# Patient Record
Sex: FEMALE | Race: WHITE | NOT HISPANIC OR LATINO | Employment: OTHER | URBAN - METROPOLITAN AREA
[De-identification: names, ages, dates, MRNs, and addresses within clinical notes are randomized per-mention and may not be internally consistent; named-entity substitution may affect disease eponyms.]

---

## 2018-10-28 ENCOUNTER — NURSE TRIAGE (OUTPATIENT)
Dept: ADMINISTRATIVE | Facility: CLINIC | Age: 38
End: 2018-10-28

## 2019-04-09 PROBLEM — G35 MULTIPLE SCLEROSIS: Status: ACTIVE | Noted: 2019-04-09

## 2020-02-11 ENCOUNTER — HOSPITAL ENCOUNTER (OUTPATIENT)
Facility: OTHER | Age: 40
Discharge: HOME | End: 2020-02-12
Attending: EMERGENCY MEDICINE | Admitting: EMERGENCY MEDICINE
Payer: MEDICAID

## 2020-02-11 DIAGNOSIS — E87.6 HYPOKALEMIA: ICD-10-CM

## 2020-02-11 DIAGNOSIS — G35 MULTIPLE SCLEROSIS: ICD-10-CM

## 2020-02-11 DIAGNOSIS — R60.0 LEG EDEMA: ICD-10-CM

## 2020-02-11 DIAGNOSIS — E87.0 HYPERNATREMIA: Primary | ICD-10-CM

## 2020-02-11 PROBLEM — E03.9 ACQUIRED HYPOTHYROIDISM: Status: ACTIVE | Noted: 2020-02-11

## 2020-02-11 LAB
ALBUMIN SERPL BCP-MCNC: 4.8 G/DL (ref 3.5–5.2)
ALP SERPL-CCNC: 60 U/L (ref 55–135)
ALT SERPL W/O P-5'-P-CCNC: 12 U/L (ref 10–44)
ANION GAP SERPL CALC-SCNC: 18 MMOL/L (ref 8–16)
AST SERPL-CCNC: 19 U/L (ref 10–40)
BASOPHILS # BLD AUTO: 0.06 K/UL (ref 0–0.2)
BASOPHILS NFR BLD: 1.2 % (ref 0–1.9)
BILIRUB SERPL-MCNC: 0.6 MG/DL (ref 0.1–1)
BILIRUB UR QL STRIP: NEGATIVE
BUN SERPL-MCNC: 16 MG/DL (ref 6–20)
CALCIUM SERPL-MCNC: 10.2 MG/DL (ref 8.7–10.5)
CHLORIDE SERPL-SCNC: 96 MMOL/L (ref 95–110)
CLARITY UR: CLEAR
CO2 SERPL-SCNC: 32 MMOL/L (ref 23–29)
COLOR UR: YELLOW
CREAT SERPL-MCNC: 1.2 MG/DL (ref 0.5–1.4)
CTP QC/QA: YES
DIFFERENTIAL METHOD: ABNORMAL
EOSINOPHIL # BLD AUTO: 0.1 K/UL (ref 0–0.5)
EOSINOPHIL NFR BLD: 2.6 % (ref 0–8)
ERYTHROCYTE [DISTWIDTH] IN BLOOD BY AUTOMATED COUNT: 13.2 % (ref 11.5–14.5)
EST. GFR  (AFRICAN AMERICAN): >60 ML/MIN/1.73 M^2
EST. GFR  (NON AFRICAN AMERICAN): 57 ML/MIN/1.73 M^2
GLUCOSE SERPL-MCNC: 96 MG/DL (ref 70–110)
GLUCOSE UR QL STRIP: NEGATIVE
HCT VFR BLD AUTO: 43.8 % (ref 37–48.5)
HGB BLD-MCNC: 14.4 G/DL (ref 12–16)
HGB UR QL STRIP: NEGATIVE
IMM GRANULOCYTES # BLD AUTO: 0.01 K/UL (ref 0–0.04)
IMM GRANULOCYTES NFR BLD AUTO: 0.2 % (ref 0–0.5)
KETONES UR QL STRIP: NEGATIVE
LACTATE SERPL-SCNC: 1.1 MMOL/L (ref 0.5–2.2)
LEUKOCYTE ESTERASE UR QL STRIP: NEGATIVE
LYMPHOCYTES # BLD AUTO: 1.6 K/UL (ref 1–4.8)
LYMPHOCYTES NFR BLD: 31.2 % (ref 18–48)
MCH RBC QN AUTO: 31.6 PG (ref 27–31)
MCHC RBC AUTO-ENTMCNC: 32.9 G/DL (ref 32–36)
MCV RBC AUTO: 96 FL (ref 82–98)
MONOCYTES # BLD AUTO: 0.7 K/UL (ref 0.3–1)
MONOCYTES NFR BLD: 14.5 % (ref 4–15)
NEUTROPHILS # BLD AUTO: 2.5 K/UL (ref 1.8–7.7)
NEUTROPHILS NFR BLD: 50.3 % (ref 38–73)
NITRITE UR QL STRIP: NEGATIVE
NRBC BLD-RTO: 0 /100 WBC
PH UR STRIP: 6 [PH] (ref 5–8)
PLATELET # BLD AUTO: 214 K/UL (ref 150–350)
PMV BLD AUTO: 11.3 FL (ref 9.2–12.9)
POC MOLECULAR INFLUENZA A AGN: NEGATIVE
POC MOLECULAR INFLUENZA B AGN: NEGATIVE
POCT GLUCOSE: 91 MG/DL (ref 70–110)
POTASSIUM SERPL-SCNC: 2.6 MMOL/L (ref 3.5–5.1)
PROT SERPL-MCNC: 8 G/DL (ref 6–8.4)
PROT UR QL STRIP: NEGATIVE
RBC # BLD AUTO: 4.56 M/UL (ref 4–5.4)
SODIUM SERPL-SCNC: 146 MMOL/L (ref 136–145)
SP GR UR STRIP: 1.01 (ref 1–1.03)
URN SPEC COLLECT METH UR: NORMAL
UROBILINOGEN UR STRIP-ACNC: NEGATIVE EU/DL
WBC # BLD AUTO: 5.04 K/UL (ref 3.9–12.7)

## 2020-02-11 PROCEDURE — G0378 HOSPITAL OBSERVATION PER HR: HCPCS

## 2020-02-11 PROCEDURE — 81003 URINALYSIS AUTO W/O SCOPE: CPT

## 2020-02-11 PROCEDURE — 99220 PR INITIAL OBSERVATION CARE,LEVL III: CPT | Mod: ,,, | Performed by: NURSE PRACTITIONER

## 2020-02-11 PROCEDURE — 99285 EMERGENCY DEPT VISIT HI MDM: CPT | Mod: 25

## 2020-02-11 PROCEDURE — 25000003 PHARM REV CODE 250: Performed by: NURSE PRACTITIONER

## 2020-02-11 PROCEDURE — 63600175 PHARM REV CODE 636 W HCPCS: Performed by: PHYSICIAN ASSISTANT

## 2020-02-11 PROCEDURE — 82962 GLUCOSE BLOOD TEST: CPT

## 2020-02-11 PROCEDURE — 80053 COMPREHEN METABOLIC PANEL: CPT

## 2020-02-11 PROCEDURE — 96360 HYDRATION IV INFUSION INIT: CPT

## 2020-02-11 PROCEDURE — 99220 PR INITIAL OBSERVATION CARE,LEVL III: ICD-10-PCS | Mod: ,,, | Performed by: NURSE PRACTITIONER

## 2020-02-11 PROCEDURE — 93010 EKG 12-LEAD: ICD-10-PCS | Mod: ,,, | Performed by: INTERNAL MEDICINE

## 2020-02-11 PROCEDURE — 25000003 PHARM REV CODE 250: Performed by: PHYSICIAN ASSISTANT

## 2020-02-11 PROCEDURE — 94761 N-INVAS EAR/PLS OXIMETRY MLT: CPT

## 2020-02-11 PROCEDURE — 93010 ELECTROCARDIOGRAM REPORT: CPT | Mod: ,,, | Performed by: INTERNAL MEDICINE

## 2020-02-11 PROCEDURE — 85025 COMPLETE CBC W/AUTO DIFF WBC: CPT

## 2020-02-11 PROCEDURE — 93005 ELECTROCARDIOGRAM TRACING: CPT

## 2020-02-11 PROCEDURE — 83605 ASSAY OF LACTIC ACID: CPT

## 2020-02-11 PROCEDURE — 87040 BLOOD CULTURE FOR BACTERIA: CPT | Mod: 59

## 2020-02-11 RX ORDER — FUROSEMIDE 40 MG/1
40 TABLET ORAL DAILY
Status: DISCONTINUED | OUTPATIENT
Start: 2020-02-12 | End: 2020-02-12

## 2020-02-11 RX ORDER — POTASSIUM CHLORIDE 750 MG/1
20 TABLET, EXTENDED RELEASE ORAL DAILY
COMMUNITY
End: 2022-04-12 | Stop reason: CLARIF

## 2020-02-11 RX ORDER — OXYBUTYNIN CHLORIDE 5 MG/1
5 TABLET ORAL 3 TIMES DAILY
Status: DISCONTINUED | OUTPATIENT
Start: 2020-02-12 | End: 2020-02-12 | Stop reason: HOSPADM

## 2020-02-11 RX ORDER — GABAPENTIN 300 MG/1
600 CAPSULE ORAL NIGHTLY
Status: DISCONTINUED | OUTPATIENT
Start: 2020-02-12 | End: 2020-02-12 | Stop reason: HOSPADM

## 2020-02-11 RX ORDER — ESCITALOPRAM OXALATE 10 MG/1
10 TABLET ORAL DAILY
Status: DISCONTINUED | OUTPATIENT
Start: 2020-02-12 | End: 2020-02-12 | Stop reason: HOSPADM

## 2020-02-11 RX ORDER — ACETAMINOPHEN 325 MG/1
650 TABLET ORAL EVERY 4 HOURS PRN
Status: DISCONTINUED | OUTPATIENT
Start: 2020-02-11 | End: 2020-02-12 | Stop reason: HOSPADM

## 2020-02-11 RX ORDER — DIAZEPAM 5 MG/1
10 TABLET ORAL 2 TIMES DAILY PRN
Status: DISCONTINUED | OUTPATIENT
Start: 2020-02-11 | End: 2020-02-12 | Stop reason: HOSPADM

## 2020-02-11 RX ORDER — SERTRALINE HYDROCHLORIDE 50 MG/1
50 TABLET, FILM COATED ORAL DAILY
Status: DISCONTINUED | OUTPATIENT
Start: 2020-02-12 | End: 2020-02-12 | Stop reason: CLARIF

## 2020-02-11 RX ORDER — GABAPENTIN 300 MG/1
600 CAPSULE ORAL
Status: DISCONTINUED | OUTPATIENT
Start: 2020-02-12 | End: 2020-02-12 | Stop reason: HOSPADM

## 2020-02-11 RX ORDER — MONTELUKAST SODIUM 10 MG/1
10 TABLET ORAL NIGHTLY
Status: DISCONTINUED | OUTPATIENT
Start: 2020-02-11 | End: 2020-02-12 | Stop reason: HOSPADM

## 2020-02-11 RX ORDER — LEVOTHYROXINE SODIUM 112 UG/1
112 TABLET ORAL
Status: DISCONTINUED | OUTPATIENT
Start: 2020-02-12 | End: 2020-02-12 | Stop reason: HOSPADM

## 2020-02-11 RX ORDER — SODIUM CHLORIDE 0.9 % (FLUSH) 0.9 %
10 SYRINGE (ML) INJECTION
Status: DISCONTINUED | OUTPATIENT
Start: 2020-02-11 | End: 2020-02-12 | Stop reason: HOSPADM

## 2020-02-11 RX ORDER — DALFAMPRIDINE 10 MG/1
TABLET, FILM COATED, EXTENDED RELEASE ORAL
Status: DISCONTINUED | OUTPATIENT
Start: 2020-02-12 | End: 2020-02-12 | Stop reason: HOSPADM

## 2020-02-11 RX ORDER — POTASSIUM CHLORIDE 20 MEQ/1
20 TABLET, EXTENDED RELEASE ORAL DAILY
Status: DISCONTINUED | OUTPATIENT
Start: 2020-02-12 | End: 2020-02-12 | Stop reason: ALTCHOICE

## 2020-02-11 RX ORDER — PANTOPRAZOLE SODIUM 20 MG/1
20 TABLET, DELAYED RELEASE ORAL DAILY
Status: DISCONTINUED | OUTPATIENT
Start: 2020-02-12 | End: 2020-02-12 | Stop reason: RX

## 2020-02-11 RX ORDER — BACLOFEN 10 MG/1
20 TABLET ORAL 3 TIMES DAILY
Status: DISCONTINUED | OUTPATIENT
Start: 2020-02-12 | End: 2020-02-12 | Stop reason: HOSPADM

## 2020-02-11 RX ORDER — SODIUM CHLORIDE 9 MG/ML
1000 INJECTION, SOLUTION INTRAVENOUS
Status: COMPLETED | OUTPATIENT
Start: 2020-02-11 | End: 2020-02-11

## 2020-02-11 RX ORDER — ENOXAPARIN SODIUM 100 MG/ML
40 INJECTION SUBCUTANEOUS EVERY 24 HOURS
Status: DISCONTINUED | OUTPATIENT
Start: 2020-02-11 | End: 2020-02-12 | Stop reason: ALTCHOICE

## 2020-02-11 RX ORDER — POTASSIUM CHLORIDE 20 MEQ/1
40 TABLET, EXTENDED RELEASE ORAL ONCE
Status: COMPLETED | OUTPATIENT
Start: 2020-02-11 | End: 2020-02-11

## 2020-02-11 RX ORDER — ORPHENADRINE CITRATE 100 MG/1
100 TABLET, EXTENDED RELEASE ORAL
Status: COMPLETED | OUTPATIENT
Start: 2020-02-11 | End: 2020-02-11

## 2020-02-11 RX ORDER — DALFAMPRIDINE 10 MG/1
TABLET, FILM COATED, EXTENDED RELEASE ORAL
COMMUNITY

## 2020-02-11 RX ORDER — FUROSEMIDE 40 MG/1
40 TABLET ORAL DAILY
Status: ON HOLD | COMMUNITY
End: 2020-02-12 | Stop reason: HOSPADM

## 2020-02-11 RX ORDER — ESCITALOPRAM OXALATE 10 MG/1
10 TABLET ORAL DAILY
COMMUNITY

## 2020-02-11 RX ORDER — TERBINAFINE HYDROCHLORIDE 250 MG/1
250 TABLET ORAL DAILY
Status: DISCONTINUED | OUTPATIENT
Start: 2020-02-12 | End: 2020-02-12 | Stop reason: HOSPADM

## 2020-02-11 RX ORDER — ONDANSETRON 8 MG/1
8 TABLET, ORALLY DISINTEGRATING ORAL EVERY 8 HOURS PRN
Status: DISCONTINUED | OUTPATIENT
Start: 2020-02-11 | End: 2020-02-12 | Stop reason: HOSPADM

## 2020-02-11 RX ADMIN — ORPHENADRINE CITRATE 100 MG: 100 TABLET, EXTENDED RELEASE ORAL at 06:02

## 2020-02-11 RX ADMIN — MONTELUKAST 10 MG: 10 TABLET, FILM COATED ORAL at 11:02

## 2020-02-11 RX ADMIN — POTASSIUM BICARBONATE 50 MEQ: 978 TABLET, EFFERVESCENT ORAL at 07:02

## 2020-02-11 RX ADMIN — SODIUM CHLORIDE 1000 ML: 0.9 INJECTION, SOLUTION INTRAVENOUS at 07:02

## 2020-02-11 RX ADMIN — POTASSIUM CHLORIDE 40 MEQ: 1500 TABLET, EXTENDED RELEASE ORAL at 11:02

## 2020-02-11 NOTE — ED PROVIDER NOTES
Encounter Date: 2/11/2020       History     Chief Complaint   Patient presents with    Leg Swelling     hx DVT; taking blood thinners, called EMS due to concerns for decreasing swelling. Denies CP or SOB.      Patient is 39 year old female history of MS, DVT who presents with complaints of left leg edema that was worse yesterday and is somewhat better today. She reports that she is also having some pain to the left sided upper back. She denies taking any meds today. She has no trauma or injury. She denies associated fever, chills, nausea, vomiting, chest pain or SOB. She is currently unaccompanied in the ER.         Review of patient's allergies indicates:   Allergen Reactions    Codeine Other (See Comments)     Heartburn    Sulfa (sulfonamide antibiotics) Itching    Vicodin [hydrocodone-acetaminophen] Hives and Itching    Macrolide antibiotics Anxiety and Hallucinations     No past medical history on file.  No past surgical history on file.  No family history on file.  Social History     Tobacco Use    Smoking status: Not on file   Substance Use Topics    Alcohol use: Not on file    Drug use: Not on file     Review of Systems   Constitutional: Negative for fever.   HENT: Negative for sore throat.    Respiratory: Negative for shortness of breath.    Cardiovascular: Negative for chest pain.   Gastrointestinal: Negative for nausea.   Genitourinary: Negative for dysuria.   Musculoskeletal: Negative for back pain.        Lower extremity edema   Skin: Negative for rash.   Neurological: Negative for weakness.   Hematological: Does not bruise/bleed easily.       Physical Exam     Initial Vitals [02/11/20 1556]   BP Pulse Resp Temp SpO2   120/75 79 18 97.7 °F (36.5 °C) 100 %      MAP       --         Physical Exam    Nursing note and vitals reviewed.  Constitutional: She appears well-developed and well-nourished.   Healthy-appearing female in no acute distress or obvious pain. She makes good eye contact, speaks in  clear full sentences does not ambulate on my exam.  Has a cane at bedside that she brought from home.   HENT:   Head: Normocephalic and atraumatic.   Eyes: Conjunctivae and EOM are normal. Pupils are equal, round, and reactive to light. Right eye exhibits no discharge. Left eye exhibits no discharge. No scleral icterus.   Neck: Normal range of motion.   Cardiovascular: Normal rate, regular rhythm, normal heart sounds and intact distal pulses. Exam reveals no gallop and no friction rub.    No murmur heard.  Pulmonary/Chest: Breath sounds normal. She has no wheezes. She has no rales.   Abdominal: Soft. Bowel sounds are normal. There is no tenderness. There is no rebound and no guarding.   Benign abdomen   Musculoskeletal: Normal range of motion. She exhibits no edema or tenderness.   Left lower extremity has edema nonpitting with no obvious tenderness to palpation on exam.  She has got 2+ pedal pulse with normal strength against resistance in sensation to light touch.  No knee or hip bony landmark abnormality or obvious deformity no overlying skin abrasions or evidence of trauma    Right lower extremity has normal exam with no edema deformity skin breakdown range of motion deficits.    Bilateral upper extremities have weakness 2nd to multiple sclerosis with some decreased muscle tone. No bony landmark abnormalities or deformities. Patient reports pain benign not able to reproduce this with palpation.     Lymphadenopathy:     She has no cervical adenopathy.   Neurological: She is alert and oriented to person, place, and time. She has normal strength. GCS score is 15. GCS eye subscore is 4. GCS verbal subscore is 5. GCS motor subscore is 6.   Skin: Skin is warm. Capillary refill takes less than 2 seconds. No rash and no abscess noted. No erythema.   Psychiatric: She has a normal mood and affect. Her behavior is normal. Thought content normal.         ED Course   Procedures  Labs Reviewed - No data to display        Imaging Results    None          Medical Decision Making:   ED Management:  Urgent evaluation a 39-year-old female who presents with complaints of lower extremity swelling that was worth yesterday and is improved today.  She is afebrile, nontoxic appearing, hemodynamically stable. Physical exam outlined above and reveals some edema without evidence of cellulitis or acute neurovascular compromise.  Venous ultrasound to bilateral lower extremities today reveal no evidence of DVT.  She is currently anticoagulated.  She has no evidence of bleeding or systemic complaints.  She does report some discomfort to the left shoulder region but I am not able to reproduce this with movement or palpation. She reports that feels like a muscle spasm and I can appreciate that the trapezius of the left side is very tight in seems tensing contracted.  She takes baclofen every day but I will give her a single dose of Norflex here in the emergency department for symptom relief.  Will reassure patient that there is no clinical evidence of acute neurovascular compromise that requires hospitalization or further diagnostics at time and she is encouraged to follow up with her usual vascular surgeon at [facility] in the outpatient setting.  She verbalized understanding and is amenable to plan.  She is stable for discharge.    Update:  Upon obtaining discharge vitals patient has oral temp of 94° and rectal temp of 95°.  She reports overall body aches with most pain noted to the left shoulder.  Will start IV and obtain diagnostic labs including lactic acid and blood cultures concerning for possible infectious etiology.  Will continue to follow closely.  Patient now candidate for admission rather than discharge.    Diagnostic labs reveal hypokalemia at 2.6 with slightly prolonged QT on EKG.  Patient also has hypernatremia at 146.  Will replete patient's potassium orally and admit for repletion and observation.  There is no evidence  of lactic acid and no identifiable infectious source is noted.  Patient is made aware of admission plan and verbalizes understanding.  She is stable for discharge. Case discussed with attending physician who agrees with plan.  Also discussed case with moonlighter who is amenable to admission plan.  Patient stable for transport to the floor.  Other:   I have discussed this case with another health care provider.       <> Summary of the Discussion: Svitlana Hanks                                 Clinical Impression:       ICD-10-CM ICD-9-CM   1. Hypernatremia E87.0 276.0   2. Leg edema R60.0 782.3   3. Hypokalemia E87.6 276.8                             Katie Joy PA-C  02/12/20 0113

## 2020-02-11 NOTE — ED TRIAGE NOTES
hx DVT; taking blood thinners, called EMS due to concerns for decreasing swelling. Denies CP or SOB.

## 2020-02-12 VITALS
TEMPERATURE: 100 F | RESPIRATION RATE: 17 BRPM | OXYGEN SATURATION: 97 % | HEART RATE: 80 BPM | HEIGHT: 66 IN | DIASTOLIC BLOOD PRESSURE: 94 MMHG | SYSTOLIC BLOOD PRESSURE: 149 MMHG | BODY MASS INDEX: 23.73 KG/M2

## 2020-02-12 PROBLEM — R60.0 LEG EDEMA, LEFT: Status: RESOLVED | Noted: 2020-02-11 | Resolved: 2020-02-12

## 2020-02-12 LAB
ALBUMIN SERPL BCP-MCNC: 3.8 G/DL (ref 3.5–5.2)
ALP SERPL-CCNC: 51 U/L (ref 55–135)
ALT SERPL W/O P-5'-P-CCNC: 11 U/L (ref 10–44)
ANION GAP SERPL CALC-SCNC: 11 MMOL/L (ref 8–16)
AST SERPL-CCNC: 15 U/L (ref 10–40)
BASOPHILS # BLD AUTO: 0.06 K/UL (ref 0–0.2)
BASOPHILS NFR BLD: 1 % (ref 0–1.9)
BILIRUB SERPL-MCNC: 0.5 MG/DL (ref 0.1–1)
BUN SERPL-MCNC: 11 MG/DL (ref 6–20)
CALCIUM SERPL-MCNC: 8.9 MG/DL (ref 8.7–10.5)
CHLORIDE SERPL-SCNC: 101 MMOL/L (ref 95–110)
CHOLEST SERPL-MCNC: 239 MG/DL (ref 120–199)
CHOLEST/HDLC SERPL: 3.2 {RATIO} (ref 2–5)
CO2 SERPL-SCNC: 33 MMOL/L (ref 23–29)
CREAT SERPL-MCNC: 1 MG/DL (ref 0.5–1.4)
DIFFERENTIAL METHOD: ABNORMAL
EOSINOPHIL # BLD AUTO: 0.1 K/UL (ref 0–0.5)
EOSINOPHIL NFR BLD: 1.9 % (ref 0–8)
ERYTHROCYTE [DISTWIDTH] IN BLOOD BY AUTOMATED COUNT: 13.2 % (ref 11.5–14.5)
EST. GFR  (AFRICAN AMERICAN): >60 ML/MIN/1.73 M^2
EST. GFR  (NON AFRICAN AMERICAN): >60 ML/MIN/1.73 M^2
GLUCOSE SERPL-MCNC: 133 MG/DL (ref 70–110)
HCT VFR BLD AUTO: 39.9 % (ref 37–48.5)
HDLC SERPL-MCNC: 75 MG/DL (ref 40–75)
HDLC SERPL: 31.4 % (ref 20–50)
HGB BLD-MCNC: 13.2 G/DL (ref 12–16)
IMM GRANULOCYTES # BLD AUTO: 0.02 K/UL (ref 0–0.04)
IMM GRANULOCYTES NFR BLD AUTO: 0.3 % (ref 0–0.5)
LDLC SERPL CALC-MCNC: 146.8 MG/DL (ref 63–159)
LYMPHOCYTES # BLD AUTO: 1.7 K/UL (ref 1–4.8)
LYMPHOCYTES NFR BLD: 28.9 % (ref 18–48)
MAGNESIUM SERPL-MCNC: 1.2 MG/DL (ref 1.6–2.6)
MCH RBC QN AUTO: 31.7 PG (ref 27–31)
MCHC RBC AUTO-ENTMCNC: 33.1 G/DL (ref 32–36)
MCV RBC AUTO: 96 FL (ref 82–98)
MONOCYTES # BLD AUTO: 0.8 K/UL (ref 0.3–1)
MONOCYTES NFR BLD: 13.2 % (ref 4–15)
NEUTROPHILS # BLD AUTO: 3.2 K/UL (ref 1.8–7.7)
NEUTROPHILS NFR BLD: 54.7 % (ref 38–73)
NONHDLC SERPL-MCNC: 164 MG/DL
NRBC BLD-RTO: 0 /100 WBC
PHOSPHATE SERPL-MCNC: 3.1 MG/DL (ref 2.7–4.5)
PLATELET # BLD AUTO: 236 K/UL (ref 150–350)
PMV BLD AUTO: 11.6 FL (ref 9.2–12.9)
POTASSIUM SERPL-SCNC: 2.9 MMOL/L (ref 3.5–5.1)
PROT SERPL-MCNC: 6.4 G/DL (ref 6–8.4)
RBC # BLD AUTO: 4.17 M/UL (ref 4–5.4)
SODIUM SERPL-SCNC: 145 MMOL/L (ref 136–145)
TRIGL SERPL-MCNC: 86 MG/DL (ref 30–150)
WBC # BLD AUTO: 5.92 K/UL (ref 3.9–12.7)

## 2020-02-12 PROCEDURE — 99225 PR SUBSEQUENT OBSERVATION CARE,LEVEL II: CPT | Mod: ,,, | Performed by: HOSPITALIST

## 2020-02-12 PROCEDURE — 85025 COMPLETE CBC W/AUTO DIFF WBC: CPT

## 2020-02-12 PROCEDURE — 83735 ASSAY OF MAGNESIUM: CPT

## 2020-02-12 PROCEDURE — 99225 PR SUBSEQUENT OBSERVATION CARE,LEVEL II: ICD-10-PCS | Mod: ,,, | Performed by: HOSPITALIST

## 2020-02-12 PROCEDURE — 25000003 PHARM REV CODE 250: Performed by: HOSPITALIST

## 2020-02-12 PROCEDURE — 80053 COMPREHEN METABOLIC PANEL: CPT

## 2020-02-12 PROCEDURE — G0378 HOSPITAL OBSERVATION PER HR: HCPCS

## 2020-02-12 PROCEDURE — 25000003 PHARM REV CODE 250: Performed by: NURSE PRACTITIONER

## 2020-02-12 PROCEDURE — 80061 LIPID PANEL: CPT

## 2020-02-12 PROCEDURE — 36415 COLL VENOUS BLD VENIPUNCTURE: CPT

## 2020-02-12 PROCEDURE — 84100 ASSAY OF PHOSPHORUS: CPT

## 2020-02-12 RX ORDER — POTASSIUM CHLORIDE 20 MEQ/1
40 TABLET, EXTENDED RELEASE ORAL EVERY 4 HOURS
Status: DISCONTINUED | OUTPATIENT
Start: 2020-02-12 | End: 2020-02-12 | Stop reason: HOSPADM

## 2020-02-12 RX ORDER — SPIRONOLACTONE 25 MG/1
25 TABLET ORAL DAILY
Status: DISCONTINUED | OUTPATIENT
Start: 2020-02-12 | End: 2020-02-12 | Stop reason: HOSPADM

## 2020-02-12 RX ORDER — IBUPROFEN 400 MG/1
800 TABLET ORAL 3 TIMES DAILY PRN
Status: DISCONTINUED | OUTPATIENT
Start: 2020-02-12 | End: 2020-02-12 | Stop reason: HOSPADM

## 2020-02-12 RX ADMIN — SPIRONOLACTONE 25 MG: 25 TABLET ORAL at 08:02

## 2020-02-12 RX ADMIN — OXYBUTYNIN CHLORIDE 5 MG: 5 TABLET ORAL at 08:02

## 2020-02-12 RX ADMIN — GABAPENTIN 600 MG: 300 CAPSULE ORAL at 01:02

## 2020-02-12 RX ADMIN — APIXABAN 5 MG: 2.5 TABLET, FILM COATED ORAL at 02:02

## 2020-02-12 RX ADMIN — APIXABAN 5 MG: 2.5 TABLET, FILM COATED ORAL at 08:02

## 2020-02-12 RX ADMIN — DIAZEPAM 10 MG: 5 TABLET ORAL at 01:02

## 2020-02-12 RX ADMIN — LEVOTHYROXINE SODIUM 112 MCG: 112 TABLET ORAL at 05:02

## 2020-02-12 RX ADMIN — BACLOFEN 20 MG: 10 TABLET ORAL at 08:02

## 2020-02-12 RX ADMIN — TERBINAFINE 250 MG: 250 TABLET ORAL at 08:02

## 2020-02-12 RX ADMIN — ESCITALOPRAM OXALATE 10 MG: 10 TABLET ORAL at 08:02

## 2020-02-12 NOTE — PLAN OF CARE
Problem: Adult Inpatient Plan of Care  Goal: Plan of Care Review  Outcome: Ongoing, Progressing  POC REVIEWED WITH PT   Goal: Absence of Hospital-Acquired Illness or Injury  Outcome: Ongoing, Progressing  Fall precautions in place. Routine monitoring and assessment      Problem: Fall Injury Risk  Goal: Absence of Fall and Fall-Related Injury  Outcome: Ongoing, Progressing      Pt without fall injury. Call light in reach, bedside commode next to bed, instructed to call for mobility, non skid socks in place, bed alarm set     Problem: Chronic Pain (Multiple Sclerosis)  Goal: Acceptable Pain Control  Outcome: Ongoing, Progressing  Given home medications as prescribed, PRN ibuprofen        Problem: Mobility Impairment (Multiple Sclerosis)  Goal: Safety with Mobility  Outcome: Ongoing, Progressing      Pt ambulates with cane. Stand by assistance encouraged

## 2020-02-12 NOTE — PROGRESS NOTES
Patient free from falls and injury, free from skin breakdown. Verbalizes understanding of dc teaching and instructions, denies questions and concerns. Patient stated that she would prefer a cab ride home rather than wait for Medicare transport. Patient states that someone will be available to let her inside of her home. IV dc no complications.

## 2020-02-12 NOTE — HPI
From H&P by Yaya Hanks NP:  The patient is 39 year old female history of MS, DVT who presents with complaints of left leg edema that was worse yesterday and is somewhat better today. She reports that she is also having some pain to the left sided upper back. She denies taking any meds today. She has no trauma or injury. She denies associated fever, chills, nausea, vomiting, chest pain or SOB. She is currently unaccompanied in the ER.  Labs were significant for potassium level of 2.6.

## 2020-02-12 NOTE — DISCHARGE SUMMARY
[facility] Hospital Medicine  Discharge Summary      Patient Name: Oliva June  MRN: [id number]  Admission Date: 2/11/2020  Hospital Length of Stay: 0 days  Discharge Date and Time:  02/12/2020 8:34 AM  Attending Physician: Esperanza Kidd MD   Discharging Provider: Esperanza Kidd MD  [facility] Care Provider[place] [facility]      HPI:   From H&P by Yaya Hanks NP:  The patient is 39 year old female history of MS, DVT who presents with complaints of left leg edema that was worse yesterday and is somewhat better today. She reports that she is also having some pain to the left sided upper back. She denies taking any meds today. She has no trauma or injury. She denies associated fever, chills, nausea, vomiting, chest pain or SOB. She is currently unaccompanied in the ER.  Labs were significant for potassium level of 2.6.           Hospital Course:   Patient was given IV and oral potassium supplementation.  On my exam the following morning she did not have any evidence of LE edema.  She had been prescribed furosemide 40 mg daily as outpatient when she started having swelling of her leg due to the DVT, along with potassium 20 mEq daily - however, was only taking 10 mEq daily due to an insurance limitation.  As she did not have edema when I saw her and did not have any clinical evidence of heart failure I discontinued her furosemide and instructed her to continue taking the potassium supplement once daily until she follows up with her PCP next week.  She should have a BMP drawn at that time.  Potassium level was 2.9 this morning, and she did not want to wait for additional labs to be drawn as she was feeling well and has an appointment at 1 PM with her new neurologist at \[facility]".     Consults:   Consults (From admission, onward)        Status Ordering Provider     IP consult to case management  Once     Provider:  (Not yet assigned)    Acknowledged KENDRA MCLAUGHLIN             Final Active Diagnoses:    Diagnosis Date Noted POA    PRINCIPAL PROBLEM:  Hypokalemia [E87.6] 02/11/2020 Yes    Acquired hypothyroidism [E03.9] 02/11/2020 Yes    Multiple sclerosis [G35] 04/09/2019 Yes      Problems Resolved During this Admission:    Diagnosis Date Noted Date Resolved POA    Leg edema, left [R60.0] 02/11/2020 02/12/2020 Yes       Discharged Condition: stable    Disposition: Home or Self Care    Follow Up:  Follow-up Information     Fernando Carey MD.    Specialty:  Internal Medicine  Why:  As scheduled next week.  Will need BMP  Contact information:  [address] [facility] [address]  [address] [facility] [address]  [phone]                 Patient Instructions:      Diet Adult Regular     Activity as tolerated       Medications:  Reconciled Home Medications:      Medication List      CONTINUE taking these medications    albuterol 0.63 mg/3 mL Nebu  Commonly known as:  ACCUNEB  Take 0.63 mg by nebulization every 6 (six) hours as needed. Rescue     Ampyra 10 mg Tb12  Generic drug:  dalfampridine  Take by mouth after dinner.     baclofen 20 MG tablet  Commonly known as:  LIORESAL  Take 10 mg by mouth 4 (four) times daily.     ciclopirox 0.77 % Crea  Commonly known as:  LOPROX  Apply topically 2 (two) times daily.     diazePAM 10 MG Tab  Commonly known as:  VALIUM  Take 5 mg by mouth 2 (two) times daily as needed.     Eliquis 5 mg Tab  Generic drug:  apixaban  Take 5 mg by mouth 2 (two) times daily.     escitalopram oxalate 10 MG tablet  Commonly known as:  LEXAPRO  Take 10 mg by mouth once daily.     fexofenadine-pseudoephedrine  mg  mg per tablet  Commonly known as:  ALLEGRA-D  Take 1 tablet by mouth 2 (two) times daily.     GABAPENTIN ORAL  Take 600 capsules by mouth after dinner.     levothyroxine 112 MCG tablet  Commonly known as:  SYNTHROID  Take 112 mcg by mouth before breakfast.     montelukast 10 mg tablet  Commonly known as:  SINGULAIR  Take 10 mg by mouth every evening.      pantoprazole 20 MG tablet  Commonly known as:  PROTONIX  Take 20 mg by mouth 2 (two) times daily before meals.     potassium chloride SA 10 MEQ tablet  Commonly known as:  K-DUR,KLOR-CON  Take 20 mEq by mouth once daily.        STOP taking these medications    furosemide 40 MG tablet  Commonly known as:  LASIX            Time spent on the discharge of patient: <30 minutes  Patient was seen and examined on the date of discharge and determined to be suitable for discharge.         Esperanza Stinson MD  Department of Hospital Medicine  [facility]

## 2020-02-12 NOTE — HOSPITAL COURSE
Patient was given IV and oral potassium supplementation.  On my exam the following morning she did not have any evidence of LE edema.  She had been prescribed furosemide 40 mg daily as outpatient when she started having swelling of her leg due to the DVT, along with potassium 20 mEq daily - however, was only taking 10 mEq daily due to an insurance limitation.  As she did not have edema when I saw her and did not have any clinical evidence of heart failure I discontinued her furosemide and instructed her to continue taking the potassium supplement once daily until she follows up with her PCP next week.  She should have a BMP drawn at that time.  Potassium level was 2.9 this morning, and she did not want to wait for additional labs to be drawn as she was feeling well and has an appointment at [facility] PM [facility].

## 2020-02-12 NOTE — SUBJECTIVE & OBJECTIVE
Past Medical History:   Diagnosis Date    Anticoagulant long-term use     Anxiety     Asthma     Depression     DVT (deep venous thrombosis)     Insomnia     Multiple sclerosis        Past Surgical History:   Procedure Laterality Date    HYSTERECTOMY      THYROIDECTOMY      TONSILLECTOMY      TURBINOPLASTY         Review of patient's allergies indicates:   Allergen Reactions    Codeine Other (See Comments)     Heartburn    Sulfa (sulfonamide antibiotics) Itching    Vicodin [hydrocodone-acetaminophen] Hives and Itching    Macrolide antibiotics Anxiety and Hallucinations       No current facility-administered medications on file prior to encounter.      Current Outpatient Medications on File Prior to Encounter   Medication Sig    apixaban (ELIQUIS) 5 mg Tab Take 5 mg by mouth 2 (two) times daily.    baclofen (LIORESAL) 20 MG tablet Take 10 mg by mouth 4 (four) times daily.     dalfampridine (AMPYRA) 10 mg Tb12 Take by mouth after dinner.     diazePAM (VALIUM) 10 MG Tab Take 5 mg by mouth 2 (two) times daily as needed.     escitalopram oxalate (LEXAPRO) 10 MG tablet Take 10 mg by mouth once daily.    fexofenadine-pseudoephedrine  mg (ALLEGRA-D)  mg per tablet Take 1 tablet by mouth 2 (two) times daily.    furosemide (LASIX) 40 MG tablet Take 40 mg by mouth once daily.    GABAPENTIN ORAL Take 600 capsules by mouth after dinner.     levothyroxine (SYNTHROID) 112 MCG tablet Take 112 mcg by mouth before breakfast.    montelukast (SINGULAIR) 10 mg tablet Take 10 mg by mouth every evening.    pantoprazole (PROTONIX) 20 MG tablet Take 20 mg by mouth 2 (two) times daily before meals.     potassium chloride SA (K-DUR,KLOR-CON) 10 MEQ tablet Take 20 mEq by mouth once daily.     albuterol (ACCUNEB) 0.63 mg/3 mL Nebu Take 0.63 mg by nebulization every 6 (six) hours as needed. Rescue    ciclopirox (LOPROX) 0.77 % Crea Apply topically 2 (two) times daily.     Family History     None         Tobacco Use    Smoking status: Former Smoker     Last attempt to quit: 2019     Years since quittin.0   Substance and Sexual Activity    Alcohol use: Yes     Comment: occasional    Drug use: Never    Sexual activity: Not on file     Review of Systems   Constitutional: Negative for activity change, appetite change and fever.   HENT: Negative for congestion, ear pain, rhinorrhea and sinus pressure.    Eyes: Negative for pain and discharge.   Respiratory: Negative for cough, chest tightness, shortness of breath and wheezing.    Cardiovascular: Negative for chest pain and leg swelling.   Gastrointestinal: Negative for abdominal distention, abdominal pain, diarrhea, nausea and vomiting.   Endocrine: Negative for cold intolerance and heat intolerance.   Genitourinary: Negative for difficulty urinating, flank pain, frequency, hematuria and urgency.   Musculoskeletal: Positive for arthralgias and myalgias. Negative for joint swelling.   Allergic/Immunologic: Negative for environmental allergies and food allergies.   Neurological: Positive for weakness. Negative for dizziness, light-headedness and headaches.   Hematological: Does not bruise/bleed easily.   Psychiatric/Behavioral: Negative for agitation, behavioral problems and decreased concentration.     Objective:     Vital Signs (Most Recent):  Temp: 97.9 °F (36.6 °C) (20)  Pulse: 75 (20)  Resp: 13 (20)  BP: 135/85 (20)  SpO2: 99 % (20) Vital Signs (24h Range):  Temp:  [93.7 °F (34.3 °C)-97.9 °F (36.6 °C)] 97.9 °F (36.6 °C)  Pulse:  [64-79] 75  Resp:  [11-18] 13  SpO2:  [96 %-100 %] 99 %  BP: (109-135)/(73-92) 135/85        Body mass index is 23.73 kg/m².    Physical Exam   Constitutional: She is oriented to person, place, and time. She appears well-developed and well-nourished.   HENT:   Head: Normocephalic.   Eyes: Conjunctivae are normal. Right eye exhibits no discharge. Left eye exhibits no  discharge.   Neck: Normal range of motion. Neck supple.   Cardiovascular: Normal rate, regular rhythm, normal heart sounds and intact distal pulses.   Pulmonary/Chest: Effort normal and breath sounds normal. No respiratory distress.   Abdominal: Soft. Bowel sounds are normal. She exhibits no distension. There is no tenderness.   Musculoskeletal: Normal range of motion.   Neurological: She is alert and oriented to person, place, and time. She has normal strength. GCS eye subscore is 4. GCS verbal subscore is 5. GCS motor subscore is 6.   Skin: Skin is warm and dry.   Psychiatric: She has a normal mood and affect. Her speech is normal and behavior is normal.           Significant Labs:   CBC:   Recent Labs   Lab 02/11/20  1840   WBC 5.04   HGB 14.4   HCT 43.8        CMP:   Recent Labs   Lab 02/11/20  1840   *   K 2.6*   CL 96   CO2 32*   GLU 96   BUN 16   CREATININE 1.2   CALCIUM 10.2   PROT 8.0   ALBUMIN 4.8   BILITOT 0.6   ALKPHOS 60   AST 19   ALT 12   ANIONGAP 18*   EGFRNONAA 57*       Significant Imaging: I have reviewed all pertinent imaging results/findings within the past 24 hours.

## 2020-02-12 NOTE — H&P
[facility] Hospital Medicine  History & Physical    Patient Name: Oliva June  MRN: [id number]  Admission Date: 2/11/2020  Attending Physician: Ed Lockett MD   Primary Care Provider: [facility]         Patient information was obtained from patient, past medical records and ER records.     Subjective:     Principal Problem:Hypokalemia    Chief Complaint:   Chief Complaint   Patient presents with    Leg Swelling     hx DVT; taking blood thinners, called EMS due to concerns for decreasing swelling. Denies CP or SOB.         HPI: The patient is 39 year old female history of MS, DVT who presents with complaints of left leg edema that was worse yesterday and is somewhat better today. She reports that she is also having some pain to the left sided upper back. She denies taking any meds today. She has no trauma or injury. She denies associated fever, chills, nausea, vomiting, chest pain or SOB. She is currently unaccompanied in the ER.      Past Medical History:   Diagnosis Date    Anticoagulant long-term use     Anxiety     Asthma     Depression     DVT (deep venous thrombosis)     Insomnia     Multiple sclerosis        Past Surgical History:   Procedure Laterality Date    HYSTERECTOMY      THYROIDECTOMY      TONSILLECTOMY      TURBINOPLASTY         Review of patient's allergies indicates:   Allergen Reactions    Codeine Other (See Comments)     Heartburn    Sulfa (sulfonamide antibiotics) Itching    Vicodin [hydrocodone-acetaminophen] Hives and Itching    Macrolide antibiotics Anxiety and Hallucinations       No current facility-administered medications on file prior to encounter.      Current Outpatient Medications on File Prior to Encounter   Medication Sig    apixaban (ELIQUIS) 5 mg Tab Take 5 mg by mouth 2 (two) times daily.    baclofen (LIORESAL) 20 MG tablet Take 10 mg by mouth 4 (four) times daily.     dalfampridine (AMPYRA) 10 mg  Tb12 Take by mouth after dinner.     diazePAM (VALIUM) 10 MG Tab Take 5 mg by mouth 2 (two) times daily as needed.     escitalopram oxalate (LEXAPRO) 10 MG tablet Take 10 mg by mouth once daily.    fexofenadine-pseudoephedrine  mg (ALLEGRA-D)  mg per tablet Take 1 tablet by mouth 2 (two) times daily.    furosemide (LASIX) 40 MG tablet Take 40 mg by mouth once daily.    GABAPENTIN ORAL Take 600 capsules by mouth after dinner.     levothyroxine (SYNTHROID) 112 MCG tablet Take 112 mcg by mouth before breakfast.    montelukast (SINGULAIR) 10 mg tablet Take 10 mg by mouth every evening.    pantoprazole (PROTONIX) 20 MG tablet Take 20 mg by mouth 2 (two) times daily before meals.     potassium chloride SA (K-DUR,KLOR-CON) 10 MEQ tablet Take 20 mEq by mouth once daily.     albuterol (ACCUNEB) 0.63 mg/3 mL Nebu Take 0.63 mg by nebulization every 6 (six) hours as needed. Rescue    ciclopirox (LOPROX) 0.77 % Crea Apply topically 2 (two) times daily.     Family History     None        Tobacco Use    Smoking status: Former Smoker     Last attempt to quit: 2019     Years since quittin.0   Substance and Sexual Activity    Alcohol use: Yes     Comment: occasional    Drug use: Never    Sexual activity: Not on file     Review of Systems   Constitutional: Negative for activity change, appetite change and fever.   HENT: Negative for congestion, ear pain, rhinorrhea and sinus pressure.    Eyes: Negative for pain and discharge.   Respiratory: Negative for cough, chest tightness, shortness of breath and wheezing.    Cardiovascular: Negative for chest pain and leg swelling.   Gastrointestinal: Negative for abdominal distention, abdominal pain, diarrhea, nausea and vomiting.   Endocrine: Negative for cold intolerance and heat intolerance.   Genitourinary: Negative for difficulty urinating, flank pain, frequency, hematuria and urgency.   Musculoskeletal: Positive for arthralgias and myalgias. Negative for  joint swelling.   Allergic/Immunologic: Negative for environmental allergies and food allergies.   Neurological: Positive for weakness. Negative for dizziness, light-headedness and headaches.   Hematological: Does not bruise/bleed easily.   Psychiatric/Behavioral: Negative for agitation, behavioral problems and decreased concentration.     Objective:     Vital Signs (Most Recent):  Temp: 97.9 °F (36.6 °C) (02/11/20 2136)  Pulse: 75 (02/11/20 2201)  Resp: 13 (02/11/20 2201)  BP: 135/85 (02/11/20 2201)  SpO2: 99 % (02/11/20 2336) Vital Signs (24h Range):  Temp:  [93.7 °F (34.3 °C)-97.9 °F (36.6 °C)] 97.9 °F (36.6 °C)  Pulse:  [64-79] 75  Resp:  [11-18] 13  SpO2:  [96 %-100 %] 99 %  BP: (109-135)/(73-92) 135/85        Body mass index is 23.73 kg/m².    Physical Exam   Constitutional: She is oriented to person, place, and time. She appears well-developed and well-nourished.   HENT:   Head: Normocephalic.   Eyes: Conjunctivae are normal. Right eye exhibits no discharge. Left eye exhibits no discharge.   Neck: Normal range of motion. Neck supple.   Cardiovascular: Normal rate, regular rhythm, normal heart sounds and intact distal pulses.   Pulmonary/Chest: Effort normal and breath sounds normal. No respiratory distress.   Abdominal: Soft. Bowel sounds are normal. She exhibits no distension. There is no tenderness.   Musculoskeletal: Normal range of motion.   Neurological: She is alert and oriented to person, place, and time. She has normal strength. GCS eye subscore is 4. GCS verbal subscore is 5. GCS motor subscore is 6.   Skin: Skin is warm and dry.   Psychiatric: She has a normal mood and affect. Her speech is normal and behavior is normal.           Significant Labs:   CBC:   Recent Labs   Lab 02/11/20 1840   WBC 5.04   HGB 14.4   HCT 43.8        CMP:   Recent Labs   Lab 02/11/20 1840   *   K 2.6*   CL 96   CO2 32*   GLU 96   BUN 16   CREATININE 1.2   CALCIUM 10.2   PROT 8.0   ALBUMIN 4.8   BILITOT 0.6    ALKPHOS 60   AST 19   ALT 12   ANIONGAP 18*   EGFRNONAA 57*       Significant Imaging: I have reviewed all pertinent imaging results/findings within the past 24 hours.    Assessment/Plan:     * Hypokalemia  K- 2.6    50 meq in ER  40 meq tonight, CMP in AM      Leg edema, left  Patient reporting improving leg edema    US- No evidence of deep venous thrombosis in either lower extremity.    Monitor for resolution      Acquired hypothyroidism  Continue levothyroxine      Multiple sclerosis  At baseline    Continue baclofen, valium, ditropan, zoloft        VTE Risk Mitigation (From admission, onward)         Ordered     apixaban tablet 5 mg  2 times daily      02/11/20 2359     enoxaparin injection 40 mg  Daily      02/11/20 2251     Place sequential compression device  Until discontinued      02/11/20 2251     Place LIZ hose  Until discontinued      02/11/20 2251     IP VTE HIGH RISK PATIENT  Once      02/11/20 2251                   Yaya Hanks NP  Department of Hospital Medicine   [facility]

## 2020-02-12 NOTE — ASSESSMENT & PLAN NOTE
Patient reporting improving leg edema    US- No evidence of deep venous thrombosis in either lower extremity.    Monitor for resolution

## 2020-02-12 NOTE — PLAN OF CARE
Pt with poor sleep throughout shift. Pt without fall injury. Pt K improved after but still low sp 90 mEQ given orally. Pt remains normorthermic and normotensive. Pt stable for shift change.

## 2020-02-12 NOTE — PLAN OF CARE
Patient is discharged home.     Patient expressed need for medicaid transportation to a MD appointment at [facility]. LMSW contacted Medicaid transport. Per Lacho Santillan they are not able to transport the patient from the hospital to the dr. Office because they can not grantee a time to  the patient same day.       LMSW informed ICU RN.     Patient will need a cab ride for discharge.        02/12/20 0923   Final Note   Assessment Type Final Discharge Note   Anticipated Discharge Disposition Home   What phone number can be called within the next 1-3 days to see how you are doing after discharge? [phone]   Right Care Referral Info   Post Acute Recommendation No Care

## 2020-02-16 LAB
BACTERIA BLD CULT: NORMAL
BACTERIA BLD CULT: NORMAL

## 2021-04-28 ENCOUNTER — PATIENT MESSAGE (OUTPATIENT)
Dept: RESEARCH | Facility: HOSPITAL | Age: 41
End: 2021-04-28

## 2021-12-21 ENCOUNTER — PATIENT MESSAGE (OUTPATIENT)
Dept: NEUROLOGY | Facility: CLINIC | Age: 41
End: 2021-12-21

## 2022-02-28 ENCOUNTER — PATIENT MESSAGE (OUTPATIENT)
Dept: PSYCHIATRY | Facility: CLINIC | Age: 42
End: 2022-02-28
Payer: MEDICARE

## 2022-03-15 ENCOUNTER — HOSPITAL ENCOUNTER (OUTPATIENT)
Dept: RADIOLOGY | Facility: OTHER | Age: 42
Discharge: HOME | End: 2022-03-15
Attending: OTOLARYNGOLOGY
Payer: MEDICARE

## 2022-03-15 DIAGNOSIS — R13.10 DYSPHAGIA: ICD-10-CM

## 2022-03-15 PROCEDURE — 25500020 PHARM REV CODE 255: Performed by: OTOLARYNGOLOGY

## 2022-03-15 PROCEDURE — A9698 NON-RAD CONTRAST MATERIALNOC: HCPCS | Performed by: OTOLARYNGOLOGY

## 2022-03-15 PROCEDURE — 74220 FL ESOPHAGRAM COMPLETE: ICD-10-PCS | Mod: 26,,, | Performed by: STUDENT IN AN ORGANIZED HEALTH CARE EDUCATION/TRAINING PROGRAM

## 2022-03-15 PROCEDURE — 74220 X-RAY XM ESOPHAGUS 1CNTRST: CPT | Mod: 26,,, | Performed by: STUDENT IN AN ORGANIZED HEALTH CARE EDUCATION/TRAINING PROGRAM

## 2022-03-15 PROCEDURE — 74220 X-RAY XM ESOPHAGUS 1CNTRST: CPT | Mod: TC

## 2022-03-15 RX ADMIN — BARIUM SULFATE 88 G: 960 POWDER, FOR SUSPENSION ORAL at 10:03

## 2022-03-18 ENCOUNTER — TELEPHONE (OUTPATIENT)
Dept: SPEECH THERAPY | Facility: HOSPITAL | Age: 42
End: 2022-03-18
Payer: MEDICARE

## 2022-03-18 DIAGNOSIS — R13.13 DYSPHAGIA, PHARYNGEAL: Primary | ICD-10-CM

## 2022-03-18 NOTE — TELEPHONE ENCOUNTER
Spoke with pt to schedule mbss 3/30@2pm. Pt informed to fast 2hrs before appt. [facility] radiology dept; pt uses wheelchair but can get up to stand for test.

## 2022-03-29 ENCOUNTER — TELEPHONE (OUTPATIENT)
Dept: SPEECH THERAPY | Facility: HOSPITAL | Age: 42
End: 2022-03-29
Payer: MEDICARE

## 2022-03-29 NOTE — TELEPHONE ENCOUNTER
Returned patient call she wanted to have her mbss rescheduled for tomorrow states she was able to find a ride.----- Message from Lissette Cleaning sent at 3/29/2022  1:09 PM CDT -----  Contact: VALENTINA ALVAREZ [[id number]]  Type: Patient Call Back    Who called:VALENTINA ALVAREZ [[id number]]    What is the request in detail: the patient would like to know if she can come in on tomorrow     Can the clinic reply by [facility]?    Would the patient rather a call back or a response via [facility]?     Best call back number: [phone] (mobile)    Additional Information:

## 2022-03-29 NOTE — TELEPHONE ENCOUNTER
Patient request to be contacted in may to reschedule mbss due to sx's and appointments coming up. needed to cancel tomorrow as she did not have a ride to [facility]----- Message from Daija Valencia sent at 3/29/2022  9:30 AM CDT -----  Regarding: Appt  Contact: [phone]  Patient Oliva calling to cancel and reschedule appt. Please call patient at [phone]      Thank You

## 2022-03-30 ENCOUNTER — CLINICAL SUPPORT (OUTPATIENT)
Dept: SPEECH THERAPY | Facility: HOSPITAL | Age: 42
End: 2022-03-30
Payer: MEDICARE

## 2022-03-30 ENCOUNTER — HOSPITAL ENCOUNTER (OUTPATIENT)
Dept: RADIOLOGY | Facility: HOSPITAL | Age: 42
Discharge: HOME | End: 2022-03-30
Payer: MEDICARE

## 2022-03-30 DIAGNOSIS — G35 MULTIPLE SCLEROSIS: ICD-10-CM

## 2022-03-30 DIAGNOSIS — K22.4 MOTILITY DISORDER, ESOPHAGEAL: ICD-10-CM

## 2022-03-30 DIAGNOSIS — R13.13 DYSPHAGIA, PHARYNGEAL: ICD-10-CM

## 2022-03-30 PROCEDURE — 92611 MOTION FLUOROSCOPY/SWALLOW: CPT | Mod: GN

## 2022-03-30 PROCEDURE — 74230 X-RAY XM SWLNG FUNCJ C+: CPT | Mod: TC

## 2022-03-30 PROCEDURE — 74230 X-RAY XM SWLNG FUNCJ C+: CPT | Mod: 26,,, | Performed by: RADIOLOGY

## 2022-03-30 PROCEDURE — A9698 NON-RAD CONTRAST MATERIALNOC: HCPCS

## 2022-03-30 PROCEDURE — 74230 FL MODIFIED BARIUM SWALLOW SPEECH STUDY: ICD-10-PCS | Mod: 26,,, | Performed by: RADIOLOGY

## 2022-03-30 PROCEDURE — 25500020 PHARM REV CODE 255

## 2022-03-30 RX ADMIN — BARIUM SULFATE 45 ML: 0.81 POWDER, FOR SUSPENSION ORAL at 02:03

## 2022-03-31 NOTE — PLAN OF CARE
Assessment / Impressions   The results of this MBSS indicate that patient demonstrates oropharyngeal swallow function appropriate for a full PO diet w/o significant restriction.  No aspiration observed. However, tail of bolus was visualized to pass slowly through proximal esophagus, inferior to CP, with pt reporting sensation of food still being present. This correlates with findings of dismotility on esophagram    Recommendations / POC   -Diet: recommend regular as tolerated  -Therapeutic technique: recommend continue use of straw single sips, chew thoroughly, added moisture to foods  -Practice 3x daily oral hygiene including use of dry mouth products as recommended  -Rec further consultation with GI regarding findings of dismotility on esophagram and brief view of proximal esophagus on this study, given pt c/o feeling foods stick and regurgitation of undigested foods.   -Contact clinician or referring provider for repeat MBSS if swallowing status changes or worsens

## 2022-03-31 NOTE — PROGRESS NOTES
"Referring provider: Dr. Los Donaldson*  Reason for visit:  Modified barium swallow study (CPT 80104)    Report Summary   --Date: 03/31/2022  --Purpose: to evaluate anatomy and physiology of the oropharyngeal swallow, to determine effectiveness of rehabilitation strategies, and to determine diet consistency and intervention recommendations.   --Diet recommendations: Regular with thin liquids as tolerated    Subjective / History    Oliva June is a 41 y.o. female referred by Dr. Alicea for Modified Barium Swallow Study (80064).  She is currently on a regular diet.  Patient reports at least 1 year history of difficulty swallowing; she describes "choking on everything...always feeling like I'm aspirating.... thyroid scar is tight and restricts my swallow." She lost 60 lbs due to malnutrition while in hospital January 2021-June 2021, she has regained about 40 lbs. She feels she does better drinking from straws with less choking. Solid foods feel as if they get stuck behind her trach scar. She takes small bites and chews thoroughly, uses liquid wash. At times undigested food comes back up hours after eating. She cannot take large pills, as they stick. She had pneumonia 1 year ago when very weak and depleted. She is not having fevers or chest congestion. She has known dx of MS, Crohn's disease. She presents in wheelchair, uses cane as necessary to ambulate short distances. She has c/o chronic dry mouth. Aspiration was noted on recent esophagram, pt reports having to stand and have head tilted back a bit while "chugging" barium.     Diet modification as a result of this problem: avoids dry foods and large bites  Weight loss: yes, but now gaining weight  History of aspiration pneumonia: pneumonia 1 year ago, possibly not related to aspiration  History of needing Heimlich maneuver: no    Past Medical History:   Diagnosis Date    Anticoagulant long-term use     Anxiety     Asthma     Depression     DVT (deep " venous thrombosis)     Insomnia     Multiple sclerosis      Current Outpatient Medications on File Prior to Visit   Medication Sig Dispense Refill    albuterol (ACCUNEB) 0.63 mg/3 mL Nebu Take 0.63 mg by nebulization every 6 (six) hours as needed. Rescue      apixaban (ELIQUIS) 5 mg Tab Take 5 mg by mouth 2 (two) times daily.      baclofen (LIORESAL) 20 MG tablet Take 10 mg by mouth 4 (four) times daily.       ciclopirox (LOPROX) 0.77 % Crea Apply topically 2 (two) times daily.      dalfampridine (AMPYRA) 10 mg Tb12 Take by mouth after dinner.       diazePAM (VALIUM) 10 MG Tab Take 5 mg by mouth 2 (two) times daily as needed.       escitalopram oxalate (LEXAPRO) 10 MG tablet Take 10 mg by mouth once daily.      fexofenadine-pseudoephedrine  mg (ALLEGRA-D)  mg per tablet Take 1 tablet by mouth 2 (two) times daily.      GABAPENTIN ORAL Take 600 capsules by mouth after dinner.       levothyroxine (SYNTHROID) 112 MCG tablet Take 112 mcg by mouth before breakfast.      montelukast (SINGULAIR) 10 mg tablet Take 10 mg by mouth every evening.      pantoprazole (PROTONIX) 20 MG tablet Take 20 mg by mouth 2 (two) times daily before meals.       potassium chloride SA (K-DUR,KLOR-CON) 10 MEQ tablet Take 20 mEq by mouth once daily.        No current facility-administered medications on file prior to visit.         SURGICAL HISTORY:  Past Surgical History:   Procedure Laterality Date    HYSTERECTOMY      THYROIDECTOMY      TONSILLECTOMY      TURBINOPLASTY         SWALLOW HISTORY:  3/15/21 barium esophagram  Impression:   1. Subglottic tracheal aspiration noted.  Recommend correlation with speech therapy evaluation.  2. Esophageal dysmotility.  This report was flagged in Epic as abnormal.    FAMILY HISTORY:  No family history on file.     SOCIAL HISTORY:  She lives with her mother who aids in her care.        Tobacco:  Quit 2019  ETOH:  yes    BEHAVIOR:  Oliva June was a very pleasant female who  had normal affect and social interaction.  She was able to fully cooperate during the study.  Results of today's assessment were considered indicative of her current level of swallow function.      HEARING:  Subjectively, within normal limits.     ORAL PERIPHERAL:   Informal examination of the oral mechanism revealed structures and function within normal limits for swallowing and speech purposes.    Voice quality was within normal limits with no wet quality before, during, or after the study.   Objective   Lateral videofluorographic views were obtained. The radiologist and speech pathologist were present for the entire procedure.     Consistencies assessed / method of administration  - Thin liquid/cup and straw sip  - Thin liquid/sequential cup and straw sips  - Applesauce level spoon bite x 2  - Pudding level spoon bite x 2  - Cracker with barium pudding  - Barium tablet w/ water    Oral phase  - Grossly WNL with adequate/timely lip closure, tongue control during bolus hold, bolus preparation, and bolus transport/lingual motion.  Little to no oral residue.      Pharyngeal phase  - adequate initiation  - adequate soft palate elevation  - adequate laryngeal elevation and adequate anterior hyoid excursion  - adequate tongue base retraction  - adequate epiglottic movement  - adequate laryngeal vestibular closure  - adequate pharyngeal stripping wave  - minimal pharyngeal residue in vallecula, cleared with repeat swallow  - adequate PE segment opening    Cervical Esophageal:  Boluses entered the upper esophagus without any evident problems.  No obstruction was noted.    Esophageal phase (screen)  Not performed. However, tail of boluses were seen lingering in proximal esophagus which may correlate with dysmotility noted on esophagram.     Rosenbek Penetration-Aspiration Scale (Rosenbek et al 1996)  Best Trial: 1. Contrast does not enter airway.   Worst Trial: 1. Contrast does not enter airway.     Assessment /  Impressions   The results of this MBSS indicate that patient demonstrates oropharyngeal swallow function appropriate for a full PO diet w/o significant restriction.  No aspiration observed. However, tail of bolus was visualized to pass slowly through proximal esophagus, inferior to CP, with pt reporting sensation of food still being present. This correlates with findings of dismotility on esophagram    Recommendations / POC   -Diet: recommend regular as tolerated  -Therapeutic technique: recommend continue use of straw single sips, chew thoroughly, added moisture to foods  -Practice 3x daily oral hygiene including use of dry mouth products as recommended  -Rec further consultation with GI regarding findings of dismotility on esophagram and brief view of proximal esophagus on this study, given pt c/o feeling foods stick and regurgitation of undigested foods.   -Contact clinician or referring provider for repeat MBSS if swallowing status changes or worsens

## 2022-04-12 ENCOUNTER — HOSPITAL ENCOUNTER (OUTPATIENT)
Dept: PREADMISSION TESTING | Facility: OTHER | Age: 42
Discharge: HOME | End: 2022-04-12
Attending: OTOLARYNGOLOGY
Payer: MEDICARE

## 2022-04-12 VITALS
DIASTOLIC BLOOD PRESSURE: 73 MMHG | SYSTOLIC BLOOD PRESSURE: 104 MMHG | HEART RATE: 84 BPM | HEIGHT: 68 IN | WEIGHT: 140 LBS | OXYGEN SATURATION: 96 % | BODY MASS INDEX: 21.22 KG/M2

## 2022-04-12 DIAGNOSIS — Z01.818 PREOP TESTING: Primary | ICD-10-CM

## 2022-04-12 LAB
ANION GAP SERPL CALC-SCNC: 17 MMOL/L (ref 8–16)
BASOPHILS # BLD AUTO: 0.03 K/UL (ref 0–0.2)
BASOPHILS NFR BLD: 0.4 % (ref 0–1.9)
BUN SERPL-MCNC: 10 MG/DL (ref 6–20)
CALCIUM SERPL-MCNC: 8.4 MG/DL (ref 8.7–10.5)
CHLORIDE SERPL-SCNC: 100 MMOL/L (ref 95–110)
CO2 SERPL-SCNC: 23 MMOL/L (ref 23–29)
CREAT SERPL-MCNC: 0.7 MG/DL (ref 0.5–1.4)
DIFFERENTIAL METHOD: ABNORMAL
EOSINOPHIL # BLD AUTO: 0.1 K/UL (ref 0–0.5)
EOSINOPHIL NFR BLD: 1.3 % (ref 0–8)
ERYTHROCYTE [DISTWIDTH] IN BLOOD BY AUTOMATED COUNT: 17.6 % (ref 11.5–14.5)
EST. GFR  (AFRICAN AMERICAN): >60 ML/MIN/1.73 M^2
EST. GFR  (NON AFRICAN AMERICAN): >60 ML/MIN/1.73 M^2
GLUCOSE SERPL-MCNC: 54 MG/DL (ref 70–110)
HCT VFR BLD AUTO: 43.2 % (ref 37–48.5)
HGB BLD-MCNC: 13.6 G/DL (ref 12–16)
IMM GRANULOCYTES # BLD AUTO: 0.01 K/UL (ref 0–0.04)
IMM GRANULOCYTES NFR BLD AUTO: 0.1 % (ref 0–0.5)
LYMPHOCYTES # BLD AUTO: 4.1 K/UL (ref 1–4.8)
LYMPHOCYTES NFR BLD: 48.4 % (ref 18–48)
MCH RBC QN AUTO: 29.8 PG (ref 27–31)
MCHC RBC AUTO-ENTMCNC: 31.5 G/DL (ref 32–36)
MCV RBC AUTO: 95 FL (ref 82–98)
MONOCYTES # BLD AUTO: 0.7 K/UL (ref 0.3–1)
MONOCYTES NFR BLD: 8.7 % (ref 4–15)
NEUTROPHILS # BLD AUTO: 3.5 K/UL (ref 1.8–7.7)
NEUTROPHILS NFR BLD: 41.1 % (ref 38–73)
NRBC BLD-RTO: 0 /100 WBC
PLATELET # BLD AUTO: 218 K/UL (ref 150–450)
PMV BLD AUTO: 10.2 FL (ref 9.2–12.9)
POTASSIUM SERPL-SCNC: 4 MMOL/L (ref 3.5–5.1)
RBC # BLD AUTO: 4.57 M/UL (ref 4–5.4)
SODIUM SERPL-SCNC: 140 MMOL/L (ref 136–145)
WBC # BLD AUTO: 8.47 K/UL (ref 3.9–12.7)

## 2022-04-12 PROCEDURE — 85025 COMPLETE CBC W/AUTO DIFF WBC: CPT | Performed by: ANESTHESIOLOGY

## 2022-04-12 PROCEDURE — 36415 COLL VENOUS BLD VENIPUNCTURE: CPT | Performed by: ANESTHESIOLOGY

## 2022-04-12 PROCEDURE — 80048 BASIC METABOLIC PNL TOTAL CA: CPT | Performed by: ANESTHESIOLOGY

## 2022-04-12 RX ORDER — ALBUTEROL SULFATE 2.5 MG/.5ML
2.5 SOLUTION RESPIRATORY (INHALATION)
Status: CANCELLED | OUTPATIENT
Start: 2022-04-12 | End: 2022-04-12

## 2022-04-12 RX ORDER — SODIUM CHLORIDE, SODIUM LACTATE, POTASSIUM CHLORIDE, CALCIUM CHLORIDE 600; 310; 30; 20 MG/100ML; MG/100ML; MG/100ML; MG/100ML
INJECTION, SOLUTION INTRAVENOUS CONTINUOUS
Status: CANCELLED | OUTPATIENT
Start: 2022-04-12

## 2022-04-12 RX ORDER — LIDOCAINE HYDROCHLORIDE 10 MG/ML
0.5 INJECTION, SOLUTION EPIDURAL; INFILTRATION; INTRACAUDAL; PERINEURAL ONCE
Status: CANCELLED | OUTPATIENT
Start: 2022-04-12 | End: 2022-04-12

## 2022-04-12 RX ORDER — ACETAMINOPHEN 500 MG
1000 TABLET ORAL
Status: CANCELLED | OUTPATIENT
Start: 2022-04-12 | End: 2022-04-12

## 2022-04-12 RX ORDER — OXYBUTYNIN CHLORIDE 10 MG/1
TABLET, EXTENDED RELEASE ORAL
COMMUNITY
Start: 2022-03-23

## 2022-04-12 RX ORDER — CLOTRIMAZOLE 1 %
CREAM (GRAM) TOPICAL
COMMUNITY
Start: 2022-03-22

## 2022-04-12 RX ORDER — IBUPROFEN 800 MG/1
800 TABLET ORAL 3 TIMES DAILY
COMMUNITY

## 2022-04-12 RX ORDER — DICYCLOMINE HYDROCHLORIDE 10 MG/1
CAPSULE ORAL
COMMUNITY
Start: 2022-04-07

## 2022-04-12 RX ORDER — GLATIRAMER ACETATE 40 MG/ML
INJECTION, SOLUTION SUBCUTANEOUS
COMMUNITY
Start: 2022-03-17 | End: 2023-12-05

## 2022-04-12 RX ORDER — HYDROXYZINE HYDROCHLORIDE 10 MG/1
TABLET, FILM COATED ORAL
COMMUNITY
Start: 2022-03-25 | End: 2023-12-05

## 2022-04-12 RX ORDER — OLOPATADINE HYDROCHLORIDE 665 UG/1
SPRAY NASAL
COMMUNITY
Start: 2022-03-18 | End: 2023-12-05

## 2022-04-12 NOTE — DISCHARGE INSTRUCTIONS
Information to Prepare you for your Surgery    PRE-ADMIT TESTING -  [phone]    [address] [facility]          Your surgery has been scheduled at [facility]. We are pleased to have the opportunity to serve you. For Further Information please call [phone].    On the day of surgery please report to the Information Desk on the 1st floor.    CONTACT YOUR PHYSICIAN'S OFFICE THE DAY PRIOR TO YOUR SURGERY TO OBTAIN YOUR ARRIVAL TIME.     The evening before surgery do not eat anything after 9 p.m. ( this includes hard candy, chewing gum and mints).  You may only have GATORADE, POWERADE AND WATER  from 9 p.m. until you leave your home.   DO NOT DRINK ANY LIQUIDS ON THE WAY TO THE HOSPITAL.      Why does your anesthesiologist allow you to drink Gatorade/Powerade before surgery?  Gatorade/Powerade helps to increase your comfort before surgery and to decrease your nausea after surgery. The carbohydrates in Gatorade/Powerade help reduce your body's stress response to surgery.  If you are a diabetic-drink only water prior to surgery.      Current Visitor policy(12/27/2021) - Patients may have 2 visitors pre and post procedure. Only 2 visitors will be allowed in the Surgical building with the patient.     SPECIAL MEDICATION INSTRUCTIONS: TAKE medications checked off by the Anesthesiologist on your Medication List.    Angiogram Patients: Take medications as instructed by your physician, including aspirin.     Surgery Patients:    If you take ASPIRIN - Your PHYSICIAN/SURGEON will need to inform you IF/OR when you need to stop taking aspirin prior to your surgery.     Do Not take any medications containing IBUPROFEN.    Do Not Wear any make-up (especially eye make-up) to surgery. Please remove any false eyelashes or eyelash extensions. If you arrive the day of surgery with makeup/eyelashes on you will be required to remove prior to surgery. (There is a risk of corneal  abrasions if eye makeup/eyelash extensions are not removed)      Leave all valuables at home.   Do Not wear any jewelry or watches, including any metal in body piercings. Jewelry must be removed prior to coming to the hospital.  There is a possibility that rings that are unable to be removed may be cut off if they are on the surgical extremity.    Please remove all hair extensions, wigs, clips and any other metal accessories/ ornaments from your hair.  These items may pose a flammable/fire risk in Surgery and must be removed.    Do not shave your surgical area at least 5 days prior to your surgery. The surgical prep will be performed at the hospital according to Infection Control regulations.    Contact Lens must be removed before surgery. Either do not wear the contact lens or bring a case and solution for storage.  Please bring a container for eyeglasses or dentures as required.  Bring any paperwork your physician has provided, such as consent forms,  history and physicals, doctor's orders, etc.   Bring comfortable clothes that are loose fitting to wear upon discharge. Take into consideration the type of surgery being performed.  Maintain your diet as advised per your physician the day prior to surgery.      Adequate rest the night before surgery is advised.   Park in the Parking lot behind the hospital or in the [employer] Parking Garage across the street from the parking lot. Parking is complimentary.  If you will be discharged the same day as your procedure, please arrange for a responsible adult to drive you home or to accompany you if traveling by taxi.   YOU WILL NOT BE PERMITTED TO DRIVE OR TO LEAVE THE HOSPITAL ALONE AFTER SURGERY.   If you are being discharged the same day, it is strongly recommended that you arrange for someone to remain with you for the first 24 hrs following your surgery.    The Surgeon will speak to your family/visitor after your surgery regarding the outcome of your surgery and post op  care.  The Surgeon may speak to you after your surgery, but there is a possibility you may not remember the details.  Please check with your family members regarding the conversation with the Surgeon.    We strongly recommend whoever is bringing you home be present for discharge instructions.  This will ensure a thorough understanding for your post op home care.    ALL CHILDREN MUST ALWAYS BE ACCOMPANIED BY AN ADULT.    Visitors-Refer to current Visitor policy handouts.    Thank you for your cooperation.  The Staff of [facility].            Bathing Instructions with Hibiclens    Shower the evening before and morning of your procedure with Hibiclens:  Wash your face with water and your regular face wash/soap  Apply Hibiclens directly on your skin or on a wet washcloth and wash gently. When showering: Move away from the shower stream when applying Hibiclens to avoid rinsing off too soon.  Rinse thoroughly with warm water  Do not dilute Hibiclens        Dry off as usual, do not use any deodorant, powder, body lotions, perfume, after shave or cologne.

## 2022-04-20 ENCOUNTER — HOSPITAL ENCOUNTER (OUTPATIENT)
Facility: OTHER | Age: 42
Discharge: HOME | End: 2022-04-20
Attending: OTOLARYNGOLOGY | Admitting: OTOLARYNGOLOGY
Payer: MEDICARE

## 2022-04-20 VITALS
SYSTOLIC BLOOD PRESSURE: 97 MMHG | OXYGEN SATURATION: 94 % | WEIGHT: 140 LBS | RESPIRATION RATE: 18 BRPM | HEART RATE: 77 BPM | TEMPERATURE: 98 F | DIASTOLIC BLOOD PRESSURE: 63 MMHG | BODY MASS INDEX: 21.6 KG/M2

## 2022-04-20 DIAGNOSIS — L91.0 HYPERTROPHIC SCAR OF SKIN: Primary | ICD-10-CM

## 2022-04-20 PROCEDURE — 94640 AIRWAY INHALATION TREATMENT: CPT

## 2022-04-20 PROCEDURE — 25000242 PHARM REV CODE 250 ALT 637 W/ HCPCS: Performed by: ANESTHESIOLOGY

## 2022-04-20 PROCEDURE — 94761 N-INVAS EAR/PLS OXIMETRY MLT: CPT

## 2022-04-20 RX ORDER — CEFAZOLIN SODIUM 1 G/3ML
1 INJECTION, POWDER, FOR SOLUTION INTRAMUSCULAR; INTRAVENOUS
Status: ACTIVE | OUTPATIENT
Start: 2022-04-20 | End: 2022-04-20

## 2022-04-20 RX ORDER — SODIUM CHLORIDE, SODIUM LACTATE, POTASSIUM CHLORIDE, CALCIUM CHLORIDE 600; 310; 30; 20 MG/100ML; MG/100ML; MG/100ML; MG/100ML
INJECTION, SOLUTION INTRAVENOUS CONTINUOUS
Status: DISCONTINUED | OUTPATIENT
Start: 2022-04-20 | End: 2022-04-24 | Stop reason: HOSPADM

## 2022-04-20 RX ORDER — LIDOCAINE HYDROCHLORIDE 10 MG/ML
0.5 INJECTION, SOLUTION EPIDURAL; INFILTRATION; INTRACAUDAL; PERINEURAL ONCE
Status: DISCONTINUED | OUTPATIENT
Start: 2022-04-20 | End: 2022-04-24 | Stop reason: HOSPADM

## 2022-04-20 RX ORDER — ACETAMINOPHEN 500 MG
1000 TABLET ORAL
Status: DISCONTINUED | OUTPATIENT
Start: 2022-04-20 | End: 2022-04-20 | Stop reason: HOSPADM

## 2022-04-20 RX ORDER — ALBUTEROL SULFATE 2.5 MG/.5ML
2.5 SOLUTION RESPIRATORY (INHALATION)
Status: COMPLETED | OUTPATIENT
Start: 2022-04-20 | End: 2022-04-20

## 2022-04-20 RX ADMIN — ALBUTEROL SULFATE 2.5 MG: 2.5 SOLUTION RESPIRATORY (INHALATION) at 05:04

## 2022-04-20 NOTE — OR NURSING
Notified Dr Alicea, Eliquis was not stopped for 5 days. Stated would have to reschedule surgery.

## 2022-04-25 ENCOUNTER — ANESTHESIA EVENT (OUTPATIENT)
Dept: SURGERY | Facility: OTHER | Age: 42
End: 2022-04-25
Payer: MEDICARE

## 2022-04-25 NOTE — PRE ADMISSION SCREENING
Pre admit phone call completed.    Instructions given to patient about NPO status as follows:     The evening before surgery do not eat anything after 9 p.m. ( this includes hard candy, chewing gum and mints).  You may only have GATORADE, POWERADE AND WATER from 9 p.m. until you leave your home. DO NOT  DRINK ANY LIQUIDS ON THE WAY TO THE HOSPITAL.      Patient was also instructed on the below information:    Park in the Parking lot behind the hospital or in the [employer] Parking [facility]age across the street from the parking lot.  Parking is complimentary.  If you will be discharged the same day as your procedure, please arrange for a responsible adult to drive you home or  to accompany you if traveling by taxi.  YOU WILL NOT BE PERMITTED TO DRIVE OR TO LEAVE THE HOSPITAL ALONE AFTER SURGERY.  It is strongly recommended that you arrange for someone to remain with you for the first 24 hrs following your surgery.    Patient verbalized understanding of above instructions.

## 2022-04-27 ENCOUNTER — HOSPITAL ENCOUNTER (OUTPATIENT)
Facility: OTHER | Age: 42
Discharge: HOME | End: 2022-04-27
Attending: OTOLARYNGOLOGY | Admitting: OTOLARYNGOLOGY
Payer: MEDICARE

## 2022-04-27 ENCOUNTER — ANESTHESIA (OUTPATIENT)
Dept: SURGERY | Facility: OTHER | Age: 42
End: 2022-04-27
Payer: MEDICARE

## 2022-04-27 DIAGNOSIS — L91.0 HYPERTROPHIC SCAR: Primary | ICD-10-CM

## 2022-04-27 PROCEDURE — 63600175 PHARM REV CODE 636 W HCPCS: Performed by: ANESTHESIOLOGY

## 2022-04-27 PROCEDURE — 36000706: Performed by: OTOLARYNGOLOGY

## 2022-04-27 PROCEDURE — 71000039 HC RECOVERY, EACH ADD'L HOUR: Performed by: OTOLARYNGOLOGY

## 2022-04-27 PROCEDURE — P9045 ALBUMIN (HUMAN), 5%, 250 ML: HCPCS | Mod: JG | Performed by: NURSE ANESTHETIST, CERTIFIED REGISTERED

## 2022-04-27 PROCEDURE — 25000003 PHARM REV CODE 250: Performed by: NURSE ANESTHETIST, CERTIFIED REGISTERED

## 2022-04-27 PROCEDURE — 25000003 PHARM REV CODE 250: Performed by: OTOLARYNGOLOGY

## 2022-04-27 PROCEDURE — 88341 PR IHC OR ICC EACH ADD'L SINGLE ANTIBODY  STAINPR: ICD-10-PCS | Mod: 26,,, | Performed by: PATHOLOGY

## 2022-04-27 PROCEDURE — 63600175 PHARM REV CODE 636 W HCPCS: Performed by: OTOLARYNGOLOGY

## 2022-04-27 PROCEDURE — 25000242 PHARM REV CODE 250 ALT 637 W/ HCPCS: Performed by: ANESTHESIOLOGY

## 2022-04-27 PROCEDURE — 27201423 OPTIME MED/SURG SUP & DEVICES STERILE SUPPLY: Performed by: OTOLARYNGOLOGY

## 2022-04-27 PROCEDURE — 88341 IMHCHEM/IMCYTCHM EA ADD ANTB: CPT | Mod: 59 | Performed by: PATHOLOGY

## 2022-04-27 PROCEDURE — 88302 PR  SURG PATH,LEVEL II: ICD-10-PCS | Mod: 26,,, | Performed by: PATHOLOGY

## 2022-04-27 PROCEDURE — 94640 AIRWAY INHALATION TREATMENT: CPT

## 2022-04-27 PROCEDURE — 71000016 HC POSTOP RECOV ADDL HR: Performed by: OTOLARYNGOLOGY

## 2022-04-27 PROCEDURE — 88342 CHG IMMUNOCYTOCHEMISTRY: ICD-10-PCS | Mod: 26,,, | Performed by: PATHOLOGY

## 2022-04-27 PROCEDURE — 88302 TISSUE EXAM BY PATHOLOGIST: CPT | Mod: 26,,, | Performed by: PATHOLOGY

## 2022-04-27 PROCEDURE — 88342 IMHCHEM/IMCYTCHM 1ST ANTB: CPT | Mod: 26,,, | Performed by: PATHOLOGY

## 2022-04-27 PROCEDURE — 94760 N-INVAS EAR/PLS OXIMETRY 1: CPT

## 2022-04-27 PROCEDURE — 37000009 HC ANESTHESIA EA ADD 15 MINS: Performed by: OTOLARYNGOLOGY

## 2022-04-27 PROCEDURE — 71000015 HC POSTOP RECOV 1ST HR: Performed by: OTOLARYNGOLOGY

## 2022-04-27 PROCEDURE — 88302 TISSUE EXAM BY PATHOLOGIST: CPT | Performed by: PATHOLOGY

## 2022-04-27 PROCEDURE — 36000707: Performed by: OTOLARYNGOLOGY

## 2022-04-27 PROCEDURE — 88341 IMHCHEM/IMCYTCHM EA ADD ANTB: CPT | Mod: 26,,, | Performed by: PATHOLOGY

## 2022-04-27 PROCEDURE — 37000008 HC ANESTHESIA 1ST 15 MINUTES: Performed by: OTOLARYNGOLOGY

## 2022-04-27 PROCEDURE — 88342 IMHCHEM/IMCYTCHM 1ST ANTB: CPT | Performed by: PATHOLOGY

## 2022-04-27 PROCEDURE — 63600175 PHARM REV CODE 636 W HCPCS: Performed by: NURSE ANESTHETIST, CERTIFIED REGISTERED

## 2022-04-27 PROCEDURE — 71000033 HC RECOVERY, INTIAL HOUR: Performed by: OTOLARYNGOLOGY

## 2022-04-27 RX ORDER — ONDANSETRON 2 MG/ML
4 INJECTION INTRAMUSCULAR; INTRAVENOUS DAILY PRN
Status: DISCONTINUED | OUTPATIENT
Start: 2022-04-27 | End: 2022-04-27 | Stop reason: HOSPADM

## 2022-04-27 RX ORDER — ACETAMINOPHEN 325 MG/1
650 TABLET ORAL EVERY 4 HOURS PRN
Status: CANCELLED | OUTPATIENT
Start: 2022-04-27

## 2022-04-27 RX ORDER — ROCURONIUM BROMIDE 10 MG/ML
INJECTION, SOLUTION INTRAVENOUS
Status: DISCONTINUED | OUTPATIENT
Start: 2022-04-27 | End: 2022-04-27

## 2022-04-27 RX ORDER — LIDOCAINE HYDROCHLORIDE 20 MG/ML
INJECTION INTRAVENOUS
Status: DISCONTINUED | OUTPATIENT
Start: 2022-04-27 | End: 2022-04-27

## 2022-04-27 RX ORDER — ALBUMIN HUMAN 50 G/1000ML
SOLUTION INTRAVENOUS CONTINUOUS PRN
Status: DISCONTINUED | OUTPATIENT
Start: 2022-04-27 | End: 2022-04-27

## 2022-04-27 RX ORDER — FENTANYL CITRATE 50 UG/ML
INJECTION, SOLUTION INTRAMUSCULAR; INTRAVENOUS
Status: DISCONTINUED | OUTPATIENT
Start: 2022-04-27 | End: 2022-04-27

## 2022-04-27 RX ORDER — OXYCODONE HYDROCHLORIDE 5 MG/1
5 TABLET ORAL
Status: DISCONTINUED | OUTPATIENT
Start: 2022-04-27 | End: 2022-04-27 | Stop reason: HOSPADM

## 2022-04-27 RX ORDER — MEPERIDINE HYDROCHLORIDE 25 MG/ML
12.5 INJECTION INTRAMUSCULAR; INTRAVENOUS; SUBCUTANEOUS ONCE AS NEEDED
Status: DISCONTINUED | OUTPATIENT
Start: 2022-04-27 | End: 2022-04-27 | Stop reason: HOSPADM

## 2022-04-27 RX ORDER — LIDOCAINE HYDROCHLORIDE AND EPINEPHRINE 10; 10 MG/ML; UG/ML
INJECTION, SOLUTION INFILTRATION; PERINEURAL
Status: DISCONTINUED | OUTPATIENT
Start: 2022-04-27 | End: 2022-04-27 | Stop reason: HOSPADM

## 2022-04-27 RX ORDER — CEFAZOLIN SODIUM 1 G/3ML
1 INJECTION, POWDER, FOR SOLUTION INTRAMUSCULAR; INTRAVENOUS
Status: COMPLETED | OUTPATIENT
Start: 2022-04-27 | End: 2022-04-27

## 2022-04-27 RX ORDER — PHENYLEPHRINE HYDROCHLORIDE 10 MG/ML
INJECTION INTRAVENOUS
Status: DISCONTINUED | OUTPATIENT
Start: 2022-04-27 | End: 2022-04-27

## 2022-04-27 RX ORDER — PROPOFOL 10 MG/ML
VIAL (ML) INTRAVENOUS
Status: DISCONTINUED | OUTPATIENT
Start: 2022-04-27 | End: 2022-04-27

## 2022-04-27 RX ORDER — DEXMEDETOMIDINE HYDROCHLORIDE 100 UG/ML
INJECTION, SOLUTION INTRAVENOUS
Status: DISCONTINUED | OUTPATIENT
Start: 2022-04-27 | End: 2022-04-27

## 2022-04-27 RX ORDER — OXYCODONE AND ACETAMINOPHEN 5; 325 MG/1; MG/1
1 TABLET ORAL EVERY 4 HOURS PRN
Qty: 15 TABLET | Refills: 0 | Status: SHIPPED | OUTPATIENT
Start: 2022-04-27 | End: 2023-12-05

## 2022-04-27 RX ORDER — ONDANSETRON 2 MG/ML
INJECTION INTRAMUSCULAR; INTRAVENOUS
Status: DISCONTINUED | OUTPATIENT
Start: 2022-04-27 | End: 2022-04-27

## 2022-04-27 RX ORDER — HYDROMORPHONE HYDROCHLORIDE 2 MG/ML
0.4 INJECTION, SOLUTION INTRAMUSCULAR; INTRAVENOUS; SUBCUTANEOUS EVERY 5 MIN PRN
Status: DISCONTINUED | OUTPATIENT
Start: 2022-04-27 | End: 2022-04-27 | Stop reason: HOSPADM

## 2022-04-27 RX ORDER — MIDAZOLAM HYDROCHLORIDE 1 MG/ML
INJECTION INTRAMUSCULAR; INTRAVENOUS
Status: DISCONTINUED | OUTPATIENT
Start: 2022-04-27 | End: 2022-04-27

## 2022-04-27 RX ORDER — DEXAMETHASONE SODIUM PHOSPHATE 4 MG/ML
INJECTION, SOLUTION INTRA-ARTICULAR; INTRALESIONAL; INTRAMUSCULAR; INTRAVENOUS; SOFT TISSUE
Status: DISCONTINUED | OUTPATIENT
Start: 2022-04-27 | End: 2022-04-27

## 2022-04-27 RX ORDER — ALBUTEROL SULFATE 2.5 MG/.5ML
2.5 SOLUTION RESPIRATORY (INHALATION)
Status: COMPLETED | OUTPATIENT
Start: 2022-04-27 | End: 2022-04-27

## 2022-04-27 RX ORDER — IPRATROPIUM BROMIDE AND ALBUTEROL SULFATE 2.5; .5 MG/3ML; MG/3ML
3 SOLUTION RESPIRATORY (INHALATION) ONCE
Status: COMPLETED | OUTPATIENT
Start: 2022-04-27 | End: 2022-04-27

## 2022-04-27 RX ORDER — SODIUM CHLORIDE, SODIUM LACTATE, POTASSIUM CHLORIDE, CALCIUM CHLORIDE 600; 310; 30; 20 MG/100ML; MG/100ML; MG/100ML; MG/100ML
INJECTION, SOLUTION INTRAVENOUS CONTINUOUS
Status: DISCONTINUED | OUTPATIENT
Start: 2022-04-27 | End: 2022-04-27 | Stop reason: HOSPADM

## 2022-04-27 RX ORDER — SODIUM CHLORIDE 0.9 % (FLUSH) 0.9 %
3 SYRINGE (ML) INJECTION
Status: DISCONTINUED | OUTPATIENT
Start: 2022-04-27 | End: 2022-04-27 | Stop reason: HOSPADM

## 2022-04-27 RX ORDER — DEXAMETHASONE SODIUM PHOSPHATE 4 MG/ML
4 INJECTION, SOLUTION INTRA-ARTICULAR; INTRALESIONAL; INTRAMUSCULAR; INTRAVENOUS; SOFT TISSUE ONCE
Status: DISCONTINUED | OUTPATIENT
Start: 2022-04-27 | End: 2022-04-27 | Stop reason: HOSPADM

## 2022-04-27 RX ADMIN — ALBUMIN (HUMAN): 12.5 SOLUTION INTRAVENOUS at 08:04

## 2022-04-27 RX ADMIN — LIDOCAINE HYDROCHLORIDE 50 MG: 20 INJECTION, SOLUTION INTRAVENOUS at 08:04

## 2022-04-27 RX ADMIN — PROPOFOL 150 MG: 10 INJECTION, EMULSION INTRAVENOUS at 08:04

## 2022-04-27 RX ADMIN — MIDAZOLAM HYDROCHLORIDE 5 MG: 1 INJECTION, SOLUTION INTRAMUSCULAR; INTRAVENOUS at 08:04

## 2022-04-27 RX ADMIN — DEXMEDETOMIDINE HYDROCHLORIDE 12 MCG: 100 INJECTION, SOLUTION, CONCENTRATE INTRAVENOUS at 08:04

## 2022-04-27 RX ADMIN — CEFAZOLIN 2 G: 330 INJECTION, POWDER, FOR SOLUTION INTRAMUSCULAR; INTRAVENOUS at 08:04

## 2022-04-27 RX ADMIN — PHENYLEPHRINE HYDROCHLORIDE 100 MCG: 10 INJECTION INTRAVENOUS at 08:04

## 2022-04-27 RX ADMIN — FENTANYL CITRATE 100 MCG: 50 INJECTION, SOLUTION INTRAMUSCULAR; INTRAVENOUS at 08:04

## 2022-04-27 RX ADMIN — ROCURONIUM BROMIDE 50 MG: 10 SOLUTION INTRAVENOUS at 08:04

## 2022-04-27 RX ADMIN — SODIUM CHLORIDE, SODIUM LACTATE, POTASSIUM CHLORIDE, AND CALCIUM CHLORIDE: .6; .31; .03; .02 INJECTION, SOLUTION INTRAVENOUS at 07:04

## 2022-04-27 RX ADMIN — PHENYLEPHRINE HYDROCHLORIDE 150 MCG: 10 INJECTION INTRAVENOUS at 08:04

## 2022-04-27 RX ADMIN — ONDANSETRON HYDROCHLORIDE 4 MG: 2 INJECTION INTRAMUSCULAR; INTRAVENOUS at 08:04

## 2022-04-27 RX ADMIN — SUGAMMADEX 400 MG: 100 INJECTION, SOLUTION INTRAVENOUS at 09:04

## 2022-04-27 RX ADMIN — DEXAMETHASONE SODIUM PHOSPHATE 8 MG: 4 INJECTION, SOLUTION INTRAMUSCULAR; INTRAVENOUS at 08:04

## 2022-04-27 RX ADMIN — IPRATROPIUM BROMIDE AND ALBUTEROL SULFATE 3 ML: .5; 3 SOLUTION RESPIRATORY (INHALATION) at 10:04

## 2022-04-27 RX ADMIN — ALBUTEROL SULFATE 2.5 MG: 2.5 SOLUTION RESPIRATORY (INHALATION) at 06:04

## 2022-04-27 RX ADMIN — ROCURONIUM BROMIDE 15 MG: 10 SOLUTION INTRAVENOUS at 08:04

## 2022-04-27 NOTE — PLAN OF CARE
Oliva June has met all discharge criteria from Phase II. Vital Signs are stable, ambulating  without difficulty.Pain is now under control and tolerable for the pt. Pain score 7 at this time.  Discharge instructions given, patient verbalized understanding. Discharged from facility via wheelchair in stable condition.

## 2022-04-27 NOTE — TRANSFER OF CARE
"Anesthesia Transfer of Care Note    Patient: Oliva June    Procedure(s) Performed: Procedure(s) (LRB):  REVISION, STOMA, TRACHEA (N/A)    Patient location: PACU    Anesthesia Type: general    Transport from OR: Transported from OR on 6-10 L/min O2 by face mask with adequate spontaneous ventilation    Post pain: adequate analgesia    Post assessment: no apparent anesthetic complications    Post vital signs: stable    Level of consciousness: awake    Nausea/Vomiting: no nausea/vomiting    Complications: none    Transfer of care protocol was followed      Last vitals:   Visit Vitals  /62 (BP Location: Right arm, Patient Position: Lying)   Pulse 85   Temp 36.8 °C (98.3 °F) (Oral)   Resp 15   Ht 5' 7.5" (1.715 m)   Wt 63.5 kg (140 lb)   SpO2 95%   Breastfeeding No   BMI 21.60 kg/m²     "

## 2022-04-27 NOTE — ANESTHESIA PREPROCEDURE EVALUATION
04/27/2022  Oliva June is a 41 y.o., female.      Pre-op Assessment    I have reviewed the Patient Summary Reports.     I have reviewed the Nursing Notes. I have reviewed the NPO Status.   I have reviewed the Medications.     Review of Systems  Anesthesia Hx:  Denies Family Hx of Anesthesia complications.   Denies Personal Hx of Anesthesia complications.   Social:  Former Smoker    Hematology/Oncology:  Hematology Normal   Oncology Normal   Hematology Comments: On eliquis following DVT      EENT/Dental:  EENT/Dental Normal Trach stoma, scarred, progressive, attached to trachea, currently dysphagia, aspiration, painful, and lack of mobility. Now for surgical repair    Required ventilatory support for months secondary to MS. [place] removed one year ago   Cardiovascular:  Cardiovascular Normal     Pulmonary:   Asthma (seasonal inhaler) Currently no dyspnea, wheelchair bound  Sat 96% RA   Renal/:  Renal/ Normal     Hepatic/GI:  Hepatic/GI Normal    Musculoskeletal:  Musculoskeletal Normal    Neurological:   Neuromuscular Disease, Dr. Allen, [facility] neurolog[facility]    Wheelchair bound due to weakness, frequent falls    Valium for L arm/leg tremors   Endocrine:   Hypothyroidism    Dermatological:  Skin Normal    Psych:  Psychiatric Normal           Physical Exam  General: Cooperative, Alert and Oriented    Airway:  Mallampati: II   Mouth Opening: Normal  TM Distance: Normal  Neck ROM: Normal ROM    Dental:  Intact        Anesthesia Plan  Type of Anesthesia, risks & benefits discussed:    Anesthesia Type: Gen ETT  Intra-op Monitoring Plan: Standard ASA Monitors  Post Op Pain Control Plan: multimodal analgesia and IV/PO Opioids PRN  Induction:  IV  Airway Plan: Video  Informed Consent: Informed consent signed with the Patient and all parties understand the risks and agree with anesthesia plan.  All questions answered.    ASA Score: 3  Anesthesia Plan Notes: Labs today    Aspirates frequently  Complex patient      Ready For Surgery From Anesthesia Perspective.     .

## 2022-04-27 NOTE — OR NURSING
Pt is awake and alert, resp even and unlabored. No acute resp distress noted. Pt oxygen saturation decreases to 89- 90% on room air. Pt is actively talking when sats are 89-90%. Pt given IS to help increase O2, O2 sats did not improve. Anesthesia Dr. Marcum at bedside. Orders for duoneb received. Pt is ok to be discharged if O2 sats are unchanged.

## 2022-04-27 NOTE — INTERVAL H&P NOTE
The patient has been examined and the H&P has been reviewed:  I concur with the findings and no changes have occurred since H&P was written.    Surgery risks, benefits and alternative options discussed and understood by patient/family.          There are no hospital problems to display for this patient.

## 2022-04-27 NOTE — ANESTHESIA PROCEDURE NOTES
Intubation    Date/Time: 4/27/2022 8:09 AM  Performed by: Irais Seth CRNA  Authorized by: German Marcum MD     Intubation:     Induction:  Intravenous    Intubated:  Postinduction    Mask Ventilation:  Easy mask    Attempts:  1    Attempted By:  CRNA    Method of Intubation:  Video laryngoscopy    Blade:  Aviles 3    Laryngeal View Grade: Grade I - full view of cords      Difficult Airway Encountered?: No      Complications:  None    Airway Device:  Oral endotracheal tube    Airway Device Size:  6.5    Style/Cuff Inflation:  Cuffed (inflated to minimal occlusive pressure)    Secured at:  The lips    Placement Verified By:  Capnometry    Complicating Factors:  None    Findings Post-Intubation:  Atraumatic/condition of teeth unchanged and BS equal bilateral

## 2022-04-27 NOTE — ANESTHESIA POSTPROCEDURE EVALUATION
Anesthesia Post Evaluation    Patient: Oliva June    Procedure(s) Performed: Procedure(s) (LRB):  REVISION, STOMA, TRACHEA (N/A)    Final Anesthesia Type: general      Patient location during evaluation: PACU  Patient participation: Yes- Able to Participate  Level of consciousness: awake and alert  Post-procedure vital signs: reviewed and stable  Pain management: adequate  Airway patency: patent    PONV status at discharge: No PONV  Anesthetic complications: no      Cardiovascular status: blood pressure returned to baseline  Respiratory status: spontaneous ventilation  Hydration status: euvolemic  Follow-up not needed.          Vitals Value Taken Time   BP 98/59 04/27/22 0937   Temp 36.4 °C (97.5 °F) 04/27/22 0922   Pulse 90 04/27/22 0945   Resp 16 04/27/22 0922   SpO2 93 % 04/27/22 0945   Vitals shown include unvalidated device data.      No case tracking events are documented in the log.      Pain/Honey Score: Honey Score: 8 (4/27/2022  9:22 AM)

## 2022-04-27 NOTE — OR NURSING
Duoneb complete, O2 are 93 %. Pt is speaking in complete sentences, no resp distress noted. Anesthesia Dr Marcum aware, instructed to proceed with discharge.

## 2022-04-27 NOTE — DISCHARGE INSTRUCTIONS

## 2022-04-27 NOTE — OP NOTE
[facility] Surgery ([facility]  Surgery Department  Operative Note    SUMMARY     Patient: Oliva June    Medical Record: [id number]    Date of Procedure: 4/27/2022     Surgeon: Surgeon(s) and Role:     * Los Alicea MD - Primary    Assisting Surgeon: None    Pre-Operative Diagnosis: Hypertrophic scar [L91.0]    Post-Operative Diagnosis: Post-Op Diagnosis Codes:     * Hypertrophic scar [L91.0]    Procedure: Procedure(s) (LRB):  REVISION, STOMA, TRACHEA (N/A)      Operative  Findings:  1. Surgical scar of prior thyroidectomy 2. Surgical scar prior tracheotomy with scar tissue from cutaneous to trachea    Anesthesia:  General endotracheal    EBL:  Minimal    Specimens:  Surgical scars time to    Implants:  None    Complications:  None    Indications:  Patient is a very pleasant 41-year-old who unfortunately has had 2 surgical procedures on her neck.  One was tracheotomy and 1 was thyroidectomy.  The thyroidectomy was done many years ago and scars not unreasonable.  The tracheotomy was done for respiratory failure approximately 1 year ago and she has a very wide hypertrophic scar with a scar band down to the anterior tracheal wall which inhibits her swallowing.  It was recommended the surgical scar be excised an a scar revision be done with removal of the thyroid scar as well.  An informed consent was obtained.    Procedure in Detail:  Patient was taken operating room placed under general endotracheal anesthesia without difficulty.  Neck was then hyperextended.  A shoulder roll was placed.  She was prepped and draped in a sterile fashion.  Local anesthetic was infiltrated into the neck underneath the previously performed thyroidectomy scar and underneath the previously performed tracheotomy scar.  As indicated in the indications, there were 2 separate surgical scars.  The thyroid scar was acceptable but home approximately 1-1/2 cm below this thyroid scar was a very hypertrophic thickened tracheotomy scar  which was significantly scarred down to the anterior tracheal wall and happening elevation of the head laryngotracheal complex during swallowing.  The tracheotomy scar was marked out and was surgically excised using a 15 blade, skin hooks and Metzenbaum scissors.  Meticulous dissection was proceeded above and below that surgical scar to undermine the skin above and below the scar.  It was determined there was a significant amount of laxity in the upper cervical skin that would allowed me to encompass or incorporate the thyroid scar into the surgical defect.  So at that point the surgical procedure was widened and the thyroid scar was resected as well.  Undermining was performed superior and inferior to the surgical defect.  This would allow closure under minimal tension.  This resulted in 1 surgical scar rather than 2.    The closure was done complex fashion with a 2 layered fashion using a deep layer of 3-0 Vicryl pop offs and a skin layer of 5 0 nylon horizontal mattress sutures.  Once the wound was closed antibiotic ointment was applied.  A pressure bandage was applied with foam tape.  The patient was brought the anesthetic, extubated, taken to PACU in stable condition.      Discharge Note    SUMMARY     Admit Date: 4/27/2022    Discharge Date and Time:  04/27/2022 9:24 AM    Hospital Course (synopsis of major diagnoses, care, treatment, and services provided during the course of the hospital stay): Surgery today as described,  with expected outcome, no complications,  will discharge home after recovery        Final Diagnosis: Post-Op Diagnosis Codes:     * Hypertrophic scar [L91.0]    Disposition: Home or Self Care    Follow Up/Patient Instructions:     Medications:  Reconciled Home Medications:      Medication List      CONTINUE taking these medications    albuterol 0.63 mg/3 mL Nebu  Commonly known as: ACCUNEB  Take 0.63 mg by nebulization every 6 (six) hours as needed. Rescue     apixaban 5 mg Tab  Commonly  known as: ELIQUIS  Take 5 mg by mouth 2 (two) times daily.     baclofen 20 MG tablet  Commonly known as: LIORESAL  Take 10 mg by mouth 4 (four) times daily.     ciclopirox 0.77 % Crea  Commonly known as: LOPROX  Apply topically 2 (two) times daily.     clotrimazole 1 % cream  Commonly known as: LOTRIMIN     dalfampridine 10 mg Tb12  Take by mouth after dinner.     diazePAM 10 MG Tab  Commonly known as: VALIUM  Take 5 mg by mouth 2 (two) times daily as needed.     dicyclomine 10 MG capsule  Commonly known as: BENTYL  TAKE 1 CAPSULE BY MOUTH FOUR TIMES DAILY BEFORE MEALS AND AT NIGHT     EScitalopram oxalate 10 MG tablet  Commonly known as: LEXAPRO  Take 10 mg by mouth once daily.     GABAPENTIN ORAL  Take 600 capsules by mouth after dinner.     GLATOPA 40 mg/mL injection  Generic drug: glatiramer  Inject into the skin.     hydrOXYzine HCL 10 MG Tab  Commonly known as: ATARAX     ibuprofen 800 MG tablet  Commonly known as: ADVIL,MOTRIN  Take 800 mg by mouth 3 (three) times daily.     levothyroxine 112 MCG tablet  Commonly known as: SYNTHROID  Take 112 mcg by mouth before breakfast.     montelukast 10 mg tablet  Commonly known as: SINGULAIR  Take 10 mg by mouth every evening.     olopatadine 0.6 % nasal spray  Commonly known as: PATANASE  by Each Nostril route.     oxybutynin 10 MG 24 hr tablet  Commonly known as: DITROPAN-XL     pantoprazole 20 MG tablet  Commonly known as: PROTONIX  Take 20 mg by mouth 2 (two) times daily before meals.          Discharge Procedure Orders   Diet general     Lifting restrictions     Call MD for:  temperature >100.4     Call MD for:  persistent nausea and vomiting     Call MD for:  severe uncontrolled pain     Remove dressing in 24 hours   Order Comments: Clean wound with peroxide and apply bacitracin ointment to wound, daily and as needed     Activity as tolerated      Follow-up Information     Los Alicea MD Follow up.    Specialty: Otolaryngology  Contact  information:  [address]  [facility] [address]  [phone]

## 2022-04-28 VITALS
BODY MASS INDEX: 21.22 KG/M2 | TEMPERATURE: 98 F | DIASTOLIC BLOOD PRESSURE: 57 MMHG | OXYGEN SATURATION: 95 % | HEART RATE: 99 BPM | WEIGHT: 140 LBS | RESPIRATION RATE: 17 BRPM | SYSTOLIC BLOOD PRESSURE: 103 MMHG | HEIGHT: 68 IN

## 2022-05-05 LAB
FINAL PATHOLOGIC DIAGNOSIS: NORMAL
GROSS: NORMAL
Lab: NORMAL
MICROSCOPIC EXAM: NORMAL

## 2022-06-24 ENCOUNTER — PATIENT MESSAGE (OUTPATIENT)
Dept: PSYCHIATRY | Facility: CLINIC | Age: 42
End: 2022-06-24
Payer: MEDICARE

## 2022-07-20 ENCOUNTER — HOSPITAL ENCOUNTER (OUTPATIENT)
Dept: RADIOLOGY | Facility: HOSPITAL | Age: 42
Discharge: HOME | End: 2022-07-20
Attending: PHYSICIAN ASSISTANT
Payer: MEDICARE

## 2022-07-20 ENCOUNTER — OFFICE VISIT (OUTPATIENT)
Dept: ORTHOPEDICS | Facility: CLINIC | Age: 42
End: 2022-07-20
Payer: MEDICARE

## 2022-07-20 DIAGNOSIS — G35 MULTIPLE SCLEROSIS: ICD-10-CM

## 2022-07-20 DIAGNOSIS — M25.512 CHRONIC LEFT SHOULDER PAIN: ICD-10-CM

## 2022-07-20 DIAGNOSIS — G89.29 CHRONIC LEFT SHOULDER PAIN: Primary | ICD-10-CM

## 2022-07-20 DIAGNOSIS — G89.29 CHRONIC LEFT SHOULDER PAIN: ICD-10-CM

## 2022-07-20 DIAGNOSIS — M25.512 CHRONIC LEFT SHOULDER PAIN: Primary | ICD-10-CM

## 2022-07-20 DIAGNOSIS — M62.58 MUSCLE ATROPHY OF UPPER EXTREMITY: ICD-10-CM

## 2022-07-20 PROCEDURE — 1160F PR REVIEW ALL MEDS BY PRESCRIBER/CLIN PHARMACIST DOCUMENTED: ICD-10-PCS | Mod: CPTII,S$GLB,, | Performed by: PHYSICIAN ASSISTANT

## 2022-07-20 PROCEDURE — 1159F PR MEDICATION LIST DOCUMENTED IN MEDICAL RECORD: ICD-10-PCS | Mod: CPTII,S$GLB,, | Performed by: PHYSICIAN ASSISTANT

## 2022-07-20 PROCEDURE — 99203 OFFICE O/P NEW LOW 30 MIN: CPT | Mod: S$GLB,,, | Performed by: PHYSICIAN ASSISTANT

## 2022-07-20 PROCEDURE — 99999 PR PBB SHADOW E&M-EST. PATIENT-LVL III: CPT | Mod: PBBFAC,,, | Performed by: PHYSICIAN ASSISTANT

## 2022-07-20 PROCEDURE — 1160F RVW MEDS BY RX/DR IN RCRD: CPT | Mod: CPTII,S$GLB,, | Performed by: PHYSICIAN ASSISTANT

## 2022-07-20 PROCEDURE — 99203 PR OFFICE/OUTPT VISIT, NEW, LEVL III, 30-44 MIN: ICD-10-PCS | Mod: S$GLB,,, | Performed by: PHYSICIAN ASSISTANT

## 2022-07-20 PROCEDURE — 73030 XR SHOULDER COMPLETE 2 OR MORE VIEWS LEFT: ICD-10-PCS | Mod: 26,LT,, | Performed by: INTERNAL MEDICINE

## 2022-07-20 PROCEDURE — 73030 X-RAY EXAM OF SHOULDER: CPT | Mod: TC,LT

## 2022-07-20 PROCEDURE — 73030 X-RAY EXAM OF SHOULDER: CPT | Mod: 26,LT,, | Performed by: INTERNAL MEDICINE

## 2022-07-20 PROCEDURE — 1159F MED LIST DOCD IN RCRD: CPT | Mod: CPTII,S$GLB,, | Performed by: PHYSICIAN ASSISTANT

## 2022-07-20 PROCEDURE — 99999 PR PBB SHADOW E&M-EST. PATIENT-LVL III: ICD-10-PCS | Mod: PBBFAC,,, | Performed by: PHYSICIAN ASSISTANT

## 2022-07-21 NOTE — PROGRESS NOTES
SUBJECTIVE:     Chief Complaint & History of Present Illness:  Oliva June is a  New  patient 41 y.o. female who is seen here today with a complaint of    Chief Complaint   Patient presents with    Left Shoulder - Pain    .  Patient is here today for evaluation treatment of her left shoulder.  She reports that she has had decreased range of motion secondary to her MS over the past year or more but is concerned that her shoulder appears to be out of socket.  She has not had any significant increase in pain or loss and function over the past few months and has been active in physical therapy up until approximately a month ago.  There has been no trauma or injury to the shoulder  On a scale of 1-10, with 10 being worst pain imaginable, he rates this pain as 2 on good days and 6 on bad days.  she describes the pain as sore.    Review of patient's allergies indicates:   Allergen Reactions    Codeine Other (See Comments)     Heartburn    Sulfa (sulfonamide antibiotics) Itching    Vicodin [hydrocodone-acetaminophen] Hives and Itching     Can take percocet    Azithromycin Other (See Comments)     Skin melting    Chlorhexidine gluconate Itching    Macrolide antibiotics Anxiety and Hallucinations         Current Outpatient Medications   Medication Sig Dispense Refill    albuterol (ACCUNEB) 0.63 mg/3 mL Nebu Take 0.63 mg by nebulization every 6 (six) hours as needed. Rescue      apixaban (ELIQUIS) 5 mg Tab Take 5 mg by mouth 2 (two) times daily.      baclofen (LIORESAL) 20 MG tablet Take 10 mg by mouth 4 (four) times daily.       ciclopirox (LOPROX) 0.77 % Crea Apply topically 2 (two) times daily.      clotrimazole (LOTRIMIN) 1 % cream       dalfampridine 10 mg Tb12 Take by mouth after dinner.       diazePAM (VALIUM) 10 MG Tab Take 5 mg by mouth 2 (two) times daily as needed.       dicyclomine (BENTYL) 10 MG capsule TAKE 1 CAPSULE BY MOUTH FOUR TIMES DAILY BEFORE MEALS AND AT NIGHT      escitalopram  oxalate (LEXAPRO) 10 MG tablet Take 10 mg by mouth once daily.      GABAPENTIN ORAL Take 600 capsules by mouth after dinner.       GLATOPA 40 mg/mL injection Inject into the skin.      hydrOXYzine HCL (ATARAX) 10 MG Tab       ibuprofen (ADVIL,MOTRIN) 800 MG tablet Take 800 mg by mouth 3 (three) times daily.      levothyroxine (SYNTHROID) 112 MCG tablet Take 112 mcg by mouth before breakfast.      montelukast (SINGULAIR) 10 mg tablet Take 10 mg by mouth every evening.      olopatadine (PATANASE) 0.6 % nasal spray by Each Nostril route.      oxybutynin (DITROPAN-XL) 10 MG 24 hr tablet       oxyCODONE-acetaminophen (PERCOCET) 5-325 mg per tablet Take 1 tablet by mouth every 4 (four) hours as needed for Pain. 15 tablet 0    pantoprazole (PROTONIX) 20 MG tablet Take 20 mg by mouth 2 (two) times daily before meals.        No current facility-administered medications for this visit.       Past Medical History:   Diagnosis Date    Anticoagulant long-term use     Anxiety     Asthma     Crohn disease     Depression     DVT (deep venous thrombosis)     Encounter for blood transfusion     Insomnia     May-Thurner syndrome     abdominal stents    Multiple sclerosis     Neuropathy        Past Surgical History:   Procedure Laterality Date    HYSTERECTOMY      REMOVAL OF TRACHEOSTOMY TUBE      THYROIDECTOMY      TONSILLECTOMY      TRACHEOTOMY      only for 2 weeks    TURBINOPLASTY         Vital Signs (Most Recent)  There were no vitals filed for this visit.    Review of Systems:  ROS:  Constitutional: no fever or chills  Eyes: no visual changes  ENT: no nasal congestion or sore throat  Respiratory: no cough or shortness of breath, Positive for asthma  Cardiovascular: no chest pain or palpitations, Positive May-Thurner syndrome  Gastrointestinal: no nausea or vomiting, tolerating diet, Positive for Crohn disease  Genitourinary: no hematuria or dysuria, Positive hypokalemia  Integument/Breast: no rash or  pruritis  Hematologic/Lymphatic: no easy bruising or lymphadenopathy, Positive for DVT long-term anticoagulation  Musculoskeletal: no arthralgias or myalgias  Neurological: Positive multiple sclerosis, neuropathy  Behavioral/Psych: no auditory or visual hallucinations, Positive for depression, anxiety  Endocrine: no heat or cold intolerance, Hypothyroidism      OBJECTIVE:     PHYSICAL EXAM:     , General Appearance: Well nourished, well developed, in no acute distress.  Neurological: Mood & affect are normal.  Shoulder exam: left  Tenderness: none  ROM: forward flexion 90 / 90, extension 60 / 60, full abduction 90 / 90, external rotation 30 / 30Shoulder Strength: biceps 4/5, triceps 4/5, abduction 4/5, adduction 4/5  non-specific diffuse tenderness about the shoulder, marked muscle atrophy throughout the shoulder with humerus low riding side in the socket  Stability tests: anterior apprehension test negative and posterior apprehension test negative                   RADIOGRAPHS:  X-rays taken today films reviewed by me demonstrate no evidence of fracture dislocation there is opening of the glenohumeral joint space consistent with muscle atrophy    ASSESSMENT/PLAN:       ICD-10-CM ICD-9-CM   1. Chronic left shoulder pain  M25.512 719.41    G89.29 338.29       Plan: We discussed with the patient at length all the different treatment options available for her left shoulder including anti-inflammatories, acetaminophen, rest, ice, Physical therapy to include strengthening exercise, occasional cortisone injections for temporary relief, arthroscopic surgical repair, and finally shoulder arthroplasty.   Patient was reassured that her shoulders mechanically in place and the changes in its appearance her so she had with muscle atrophy from her multiple sclerosis.  She does have some limited range of motion this is primarily due to her MS versus mechanical instabilities or problems within the shoulder joint.

## 2022-08-11 ENCOUNTER — OFFICE VISIT (OUTPATIENT)
Dept: URGENT CARE | Facility: CLINIC | Age: 42
End: 2022-08-11
Payer: MEDICARE

## 2022-08-11 VITALS
DIASTOLIC BLOOD PRESSURE: 72 MMHG | RESPIRATION RATE: 17 BRPM | BODY MASS INDEX: 21.22 KG/M2 | SYSTOLIC BLOOD PRESSURE: 99 MMHG | HEART RATE: 78 BPM | WEIGHT: 140 LBS | OXYGEN SATURATION: 98 % | HEIGHT: 68 IN | TEMPERATURE: 99 F

## 2022-08-11 DIAGNOSIS — S30.0XXA CONTUSION OF SACRUM, INITIAL ENCOUNTER: ICD-10-CM

## 2022-08-11 DIAGNOSIS — S89.91XA INJURY OF RIGHT LOWER EXTREMITY, INITIAL ENCOUNTER: ICD-10-CM

## 2022-08-11 DIAGNOSIS — S80.11XA CONTUSION OF RIGHT LEG, INITIAL ENCOUNTER: ICD-10-CM
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
## 2022-08-11 DIAGNOSIS — S00.01XA ABRASION OF SCALP, INITIAL ENCOUNTER: ICD-10-CM

## 2022-08-11 DIAGNOSIS — S09.8XXA BLUNT HEAD TRAUMA, INITIAL ENCOUNTER: Primary | ICD-10-CM

## 2022-08-11 PROCEDURE — 1159F PR MEDICATION LIST DOCUMENTED IN MEDICAL RECORD: ICD-10-PCS | Mod: CPTII,S$GLB,, | Performed by: EMERGENCY MEDICINE

## 2022-08-11 PROCEDURE — 3008F BODY MASS INDEX DOCD: CPT | Mod: CPTII,S$GLB,, | Performed by: EMERGENCY MEDICINE

## 2022-08-11 PROCEDURE — 73590 X-RAY EXAM OF LOWER LEG: CPT | Mod: RT,S$GLB,, | Performed by: RADIOLOGY

## 2022-08-11 PROCEDURE — 3078F DIAST BP <80 MM HG: CPT | Mod: CPTII,S$GLB,, | Performed by: EMERGENCY MEDICINE

## 2022-08-11 PROCEDURE — 3074F PR MOST RECENT SYSTOLIC BLOOD PRESSURE < 130 MM HG: ICD-10-PCS | Mod: CPTII,S$GLB,, | Performed by: EMERGENCY MEDICINE

## 2022-08-11 PROCEDURE — 1159F MED LIST DOCD IN RCRD: CPT | Mod: CPTII,S$GLB,, | Performed by: EMERGENCY MEDICINE

## 2022-08-11 PROCEDURE — 72220 XR SACRUM AND COCCYX: ICD-10-PCS | Mod: S$GLB,,, | Performed by: RADIOLOGY

## 2022-08-11 PROCEDURE — 3078F PR MOST RECENT DIASTOLIC BLOOD PRESSURE < 80 MM HG: ICD-10-PCS | Mod: CPTII,S$GLB,, | Performed by: EMERGENCY MEDICINE

## 2022-08-11 PROCEDURE — 3008F PR BODY MASS INDEX (BMI) DOCUMENTED: ICD-10-PCS | Mod: CPTII,S$GLB,, | Performed by: EMERGENCY MEDICINE

## 2022-08-11 PROCEDURE — 99214 OFFICE O/P EST MOD 30 MIN: CPT | Mod: S$GLB,,, | Performed by: EMERGENCY MEDICINE

## 2022-08-11 PROCEDURE — 1160F PR REVIEW ALL MEDS BY PRESCRIBER/CLIN PHARMACIST DOCUMENTED: ICD-10-PCS | Mod: CPTII,S$GLB,, | Performed by: EMERGENCY MEDICINE

## 2022-08-11 PROCEDURE — 1160F RVW MEDS BY RX/DR IN RCRD: CPT | Mod: CPTII,S$GLB,, | Performed by: EMERGENCY MEDICINE

## 2022-08-11 PROCEDURE — 72220 X-RAY EXAM SACRUM TAILBONE: CPT | Mod: S$GLB,,, | Performed by: RADIOLOGY

## 2022-08-11 PROCEDURE — 99214 PR OFFICE/OUTPT VISIT, EST, LEVL IV, 30-39 MIN: ICD-10-PCS | Mod: S$GLB,,, | Performed by: EMERGENCY MEDICINE

## 2022-08-11 PROCEDURE — 73590 XR TIBIA FIBULA 2 VIEW RIGHT: ICD-10-PCS | Mod: RT,S$GLB,, | Performed by: RADIOLOGY

## 2022-08-11 PROCEDURE — 3074F SYST BP LT 130 MM HG: CPT | Mod: CPTII,S$GLB,, | Performed by: EMERGENCY MEDICINE

## 2022-08-11 RX ORDER — OXYCODONE AND ACETAMINOPHEN 7.5; 325 MG/1; MG/1
1 TABLET ORAL EVERY 12 HOURS PRN
Qty: 9 TABLET | Refills: 0 | Status: SHIPPED | OUTPATIENT
Start: 2022-08-11 | End: 2022-08-16

## 2022-08-11 RX ORDER — CEPHALEXIN 500 MG/1
500 CAPSULE ORAL EVERY 6 HOURS
Qty: 20 CAPSULE | Refills: 0 | Status: SHIPPED | OUTPATIENT
Start: 2022-08-11 | End: 2022-08-16

## 2022-08-11 RX ORDER — METHOCARBAMOL 500 MG/1
1000 TABLET, FILM COATED ORAL 4 TIMES DAILY
Qty: 56 TABLET | Refills: 0 | Status: SHIPPED | OUTPATIENT
Start: 2022-08-11 | End: 2022-08-18

## 2022-08-11 RX ORDER — MUPIROCIN 20 MG/G
OINTMENT TOPICAL
Qty: 22 G | Refills: 1 | Status: SHIPPED | OUTPATIENT
Start: 2022-08-11 | End: 2023-12-05

## 2022-08-11 NOTE — PATIENT INSTRUCTIONS
"Patient Education       Go to the Emergency Room if symptoms or condition worsens in any way    Follow up with Primary Care Provider in 5-7 days   If not improved     Head Injury Observation Discharge Instructions   About this topic   You have been asked to help care for someone who has had a head injury. Some signs of a head injury may show up in the first few hours. Others may not show up for days to weeks after the injury. It is important for you to help watch for these signs in the person who had the head injury. These include:  Trouble walking or talking or not making sense  Problems with memory or paying attention  Upset stomach or throwing up  Trouble sleeping or feeling very sleepy  Bad headache  Mood or behavior changes  Changes in eyesight  Feeling bothered by noise or light  Seizures, jerking of their arms, or legs they cannot control     What care is needed at home?   Ask the doctor what you need to do when you are with the injured person. Make sure you ask questions if you do not understand what the doctor says. This way you will know what you need to do.  Watch the injured person for the first 12 to 24 hours after they are home. Call the doctor if they have any problems. It is important to make sure the injured person is breathing normally, not throwing up, and not moaning while they sleep.  When you wake the person, ask them questions like, "Who am I?" and "Can you tell me where we are?" This will help you be sure the injured person is able to fully wake up and think clearly. If they cannot answer your questions, or if you see changes in how they look or act, call for help or go to the emergency room.  Help them to eat a light diet and drink fluids to help with upset stomach.  It is good for the injured person to rest. They do not have to stay in bed. Light activity is OK. These are things like taking a shower or a walk. They should not do heavy activities like heavy exercise or using exercise " machines or a treadmill. They should not do heavy lifting.  Help the person with the injury to rest their brain. They may need to stay away from things that need a lot of thought or focus. They should have help making important decisions. They should stay away from TV, computers, and video games. Ask the doctor when the person can go back to these things.  Ask the doctor when it is okay for them to go back to work. Let family, friends, coworkers, and employers know about the injury. They may have problems thinking, remembering, paying attention, and may get upset more easily.  Watch children closely after a head injury. Let teachers and coaches know about the injury.  Use over-the-counter drugs for mild headache, like Tylenol, as told by the doctor. Do not take aspirin or ibuprofen as they increase the risk of bleeding.  No drinking alcohol. This may slow healing, risk another injury, and make it harder to make decisions.  It is important to help the injured person avoid another head injury. This means that they will need to avoid playing sports, climbing, or other activities where they may hit their head, or heavy work using machines. Ask the doctor when the injured person may go back to these activities. Ask the doctor when it is okay for them to drive.  What follow-up care is needed?   The injured person may need to go to see rehab experts. They may be able to help get brain function back and help the injured person get better faster.  What drugs may be needed?   The doctor may order drugs to:  Help with pain  Help with dizziness  Help with upset stomach  Treat or prevent seizures  Ask if the injured person needs help to remember to take their drugs.  What problems could happen?   The injured person may develop problems such as:  Bleeding or swelling in the brain  Damage to the brain. This may lead to temporary or permanent changes in mental, physical, and emotional behavior.  Seizures  Low mood  Hearing,  smelling, or eye problems  Memory loss  When do I need to call the doctor?   If you notice the injured person having:  Problems with their brain, like:  More confusion or any change in being able to think clearly  Not being able to remember things  Very sleepy (more than expected) or hard to wake up  Behavior changes, like angry outbursts, strange behavior, or thoughts of hurting themselves or others  Headache gets worse or feels different or does not get better with over-the-counter drugs  Problems with their eyes, ears, or mouth, like:  Trouble speaking or slurred speech or not making sense  Blurry eyesight, seeing double, or other problems with their eyesight  Bleeding or clear liquid drainage from their ears or nose  Problems with how they move or feel, like:  Upset stomach and throwing up that won't go away  Dizziness or fainting  Staggering or trouble walking  Lack of strength or numbness of an arm, leg, or any part of their body  Stiff neck  Fever  Seizure, jerking of their arms, or legs they cannot control  Losing control of their bladder or bowels  Teach Back: Helping You Understand   The Teach Back Method helps you understand the information we are giving you. After you talk with the staff, tell them in your own words what you learned. This helps to make sure the staff has described each thing clearly. It also helps to explain things that may have been confusing. Before going home, make sure you can do these:  I can tell you about head injuries.  I can tell you what I will do to help the injured person rest their brain.  I can tell you what I will do if the injured person has problems remembering things.  Last Reviewed Date   2020-04-22  Consumer Information Use and Disclaimer   This information is not specific medical advice and does not replace information you receive from your health care provider. This is only a brief summary of general information. It does NOT include all information about conditions,  illnesses, injuries, tests, procedures, treatments, therapies, discharge instructions or life-style choices that may apply to you. You must talk with your health care provider for complete information about your health and treatment options. This information should not be used to decide whether or not to accept your health care providers advice, instructions or recommendations. Only your health care provider has the knowledge and training to provide advice that is right for you.  Copyright   Copyright © 2021 [employer] and its affiliates and/or licensors. All rights reserved.      Patient Education        [facility]   General Information   You came to the Emergency Department (ED) for an abrasion. An abrasion is when you have cut or scraped off the top layers of skin. Most of the time, you can care for your wound at home. How long it will take to heal is based on how serious the wound is.  What care is needed at home?   Call your regular doctor to let them know you were in the ED. Make a follow-up appointment if you were told to.  The doctor may want you to keep your wound covered as it heals. You may want to use a thin layer of antibiotic ointment to help keep the wound moist. This will also keep the dressing from sticking to the wound.  After 24 hours, you can gently wash the wound with soap and water. Pat dry and put on a clean dressing.  Change your dressing at least once a day or if it gets dirty. Gently wash the wound each day.  Always wash your hands before and after touching the wound.  Each time you change the dressing, look closely at the wound to be sure it is healing the right way. The wound may have a thin, yellowish discharge, and this is normal.  Avoid picking the scab or scratching the site which may cause more irritation.  You may take a shower, but do not soak in water or swim with an open wound. After 2 days, you can use a waterproof bandage for swimming.  When do I need to call the doctor?    You have a fever of 100.4°F (38°C) or higher or chills.  Your wound is swollen, red, or warm.  Your wound has thick yellow or green drainage.  Your wound has not healed after 10 days.  You have new or worsening symptoms.  Last Reviewed Date   2021-05-14  Consumer Information Use and Disclaimer   This information is not specific medical advice and does not replace information you receive from your health care provider. This is only a brief summary of general information. It does NOT include all information about conditions, illnesses, injuries, tests, procedures, treatments, therapies, discharge instructions or life-style choices that may apply to you. You must talk with your health care provider for complete information about your health and treatment options. This information should not be used to decide whether or not to accept your health care providers advice, instructions or recommendations. Only your health care provider has the knowledge and training to provide advice that is right for you.  Copyright   Copyright © 2021 [employer] and its affiliates and/or licensors. All rights reserved.      Patient Education        Coccyx Injury Discharge Instructions   About this topic   A coccyx injury is when you hurt or dislocate your coccyx. The coccyx, or tailbone, is triangle shaped and found at the end of your spine. Muscles and strong bands of tissue join all of these bones together. In most cases, the tailbone curves in just a little bit and it does not move very much.  What care is needed at home?   Ask your doctor what you need to do when you go home. Make sure you ask questions if you do not understand everything the doctor says.  Place an ice pack or a bag of frozen peas wrapped in a towel on the painful part. Never put ice right on the skin. Do not leave the ice on more than 10 to 15 minutes at a time.  Try not to sit for long periods of time, especially on hard surfaces.  Lean forward when you sit.  Shift your weight from side to side.  Sit on a special cushion with a hole in the middle to take pressure off of the tailbone.  What follow-up care is needed?   Your doctor may ask you to make visits to the office to check on your progress. Be sure to keep these visits. Your doctor may also send you to physical therapy.  What drugs may be needed?   The doctor may order drugs to:  Help with pain and swelling  Soften stools  Will physical activity be limited?   You may need to rest for awhile. You should not do physical activity that makes your health problem worse. You may want to refrain from sexual activity until the pain is better. Talk to your doctor if you run, work out, or play sports. You may not be able to do those things until your health problem gets better.  What changes to diet are needed?   Eat a high fiber diet to avoid straining during bowel movements. For example, eat:  Grains like: Whole grain bread, brown rice, oatmeal, brown rice, quinoa, cereal  Vegetables like: Broccoli, cabbage, carrots, cauliflower, eggplant, lettuce, peppers, zucchini, squash, pumpkin, asparagus, avocados, beets, spinach, parsnips. Fresh or frozen is much better for you than canned.  Fruits like: Apples, berries, cherries, grapes, peaches, pears, pineapple, plums, watermelons, oranges, kiwis, prunes, raisins, bananas, melons. Fresh or frozen fruit is much better for you than juice. There may be added sugar in juice and it may also have less fiber.  Drink 6 to 8 glasses of liquids each day.  What problems could happen?   Ongoing pain  Trouble sitting  Pain during sex or with bowel movements  Constipation  When do I need to call the doctor?   Problems having a bowel movement  Pain that is not relieved with medications, ice, and rest  Pain that continues to get worse after a week or two  A loss of sensation or tingling in surrounding areas  Teach Back: Helping You Understand   The Teach Back Method helps you understand the  information we are giving you. After you talk with the staff, tell them in your own words what you learned. This helps to make sure the staff has described each thing clearly. It also helps to explain things that may have been confusing. Before going home, make sure you are able to do these:  I can tell you about my condition.  I can tell you what may help ease my pain.  I can tell you what I will do if I have problems having a bowel movement.  Where can I learn more?   [facility] Choices  https://www.nhs.uk/conditions/tailbone-pain-coccydynia/   Last Reviewed Date   2020-01-14  Consumer Information Use and Disclaimer   This information is not specific medical advice and does not replace information you receive from your health care provider. This is only a brief summary of general information. It does NOT include all information about conditions, illnesses, injuries, tests, procedures, treatments, therapies, discharge instructions or life-style choices that may apply to you. You must talk with your health care provider for complete information about your health and treatment options. This information should not be used to decide whether or not to accept your health care providers advice, instructions or recommendations. Only your health care provider has the knowledge and training to provide advice that is right for you.  Copyright   Copyright © 2021 [employer] and its affiliates and/or licensors. All rights reserved.   Patient Education       Going Home on Blood Thinners   About this topic   You are on a drug that keeps your blood from clotting normally to prevent blood clots. It may also keep clots you already have from getting bigger and moving to some other part of your body. While you are on this drug you will need to take extra care to keep yourself safe. If you start to bleed, it can be very serious. You should get help right away to stop the bleeding as soon as possible.  Blood thinner drugs are also called  anticoagulants or antiplatelets. Some people take blood thinners as a pill. Others take a shot. You may be on blood thinners because you have:  An abnormal heart rhythm like atrial fibrillation  Had a heart attack or stroke  Had a heart defect since you were born  Had heart valve surgery  A blood clot somewhere in your body like your leg or lung  A stent in your heart or brain  What care is needed at home?   Ask your doctor what you need to do when you go home. Make sure you understand everything the doctor says. This way you will know what you need to do.  Take extra care with all of your drugs.  Tell ALL your doctors, dentists, nurses, and pharmacists that you are taking a blood-thinning drug. Carry a card in your wallet or wear a MedicAlert bracelet or necklace. This will help others know to not order a drug that interacts or adds to the blood-thinning drug you are taking.  Talk with your doctor before you take drugs like celecoxib, Celebrex, naproxen, Aleve, Naprosyn, ibuprofen, Advil, or Motrin.  Make sure your medicine looks the same every time you pick it up at the pharmacy. Some drugs are different colors based on the strength. Talk to the pharmacist if the color of your drug changes.  Talk to your doctor before you take any new drugs, over the counter medicines, vitamins, or supplements. Drugs, vitamins, and supplements can all change how well your anticoagulant works.  Take your blood thinner exactly as ordered.  Your doctor will order an exact amount of the blood thinner drug for you. You need to know how much you are supposed to take each day and take it at the same time each day.  You may want to make a calendar with the dose of the drug on it. Then you can rebekah off each dose as you take it.  Use a pill box to help you organize your drugs.  Do not skip doses or stop taking this drug without talking to your doctor.  Do not double the dose if you miss a dose. Ask your doctor what to do if you miss a  dose.  Learn how to give yourself a shot if the doctor has ordered the blood thinner in the shot form. You may want your caregiver to learn how to give you a shot instead. Ask about home health nurses to help you with the shots.  Protect yourself from bruising and bleeding.  Avoid activities or places where you could be bruised, cut, hurt, or fall.  Protect your hands from cuts. Take extra care when using knives or tools. Wear gloves when you work in the garden or around the house.  Use electric razors when shaving. Avoid using scissors and nail clippers.  Brush your teeth gently. Use a toothbrush with soft bristles. This will help to avoid bleeding from your gums.  Wear shoes or slippers on your feet at all times.  Blow your nose gently.  Use a stool softener so you will not have to strain with bowel movements. Do not use an enema or suppositories.  If you start to bleed:  Apply pressure to the area until the bleeding stops. This will take several minutes longer than usual because of the blood-thinning drug. Ice may also help. You may need to call for emergency help or go to the nearest emergency room.  For a nose bleed, put pressure and ice on the bridge of your nose. If the bleeding does not stop in 5 to 10 minutes or the bleeding is very heavy, go to the nearest emergency room.     What follow-up care is needed?   If you take Coumadin or warfarin, you will likely need to have lab tests done to make sure your blood is clotting the right way. These tests are very important to help the doctor make sure you are taking the right dose of your blood thinner drug.  You might need to have other lab tests to make sure your kidneys and liver are working properly.  Your doctor may ask you to make visits to the office to check on your progress. Be sure to keep these visits.  Will physical activity be limited?   You may have to limit your activity. Talk with your doctor about what activities are best for you. Ask if it is  safe for you to exercise, play sports, or have sex.  Avoid activities that can cause bruising and bleeding. Avoid climbing on ladders.  Ask your doctor about driving.  What changes to diet are needed?   What you eat and how much alcohol you drink can affect the levels of some blood thinners. If you take the blood thinner Coumadin or warfarin:  Keep the amount of vitamin K you eat in your diet consistent.  It is ok to eat foods with vitamin K as long as you eat about the same each day.  Know how much vitamin K you are eating each day. Watch your portion size.  Do not all of a sudden change how much vitamin K you are eating.  Avoid multivitamins, natural products, and dietary supplements that have added vitamin K.  Talk with your doctor or a dietitian to learn more about foods that have Vitamin K in them.  Drinking alcohol can affect how your blood clots and how your warfarin works. You may need more blood tests to check your warfarin levels if you drink alcohol. Talk with your doctor about how much alcohol you drink and how often your drink alcohol.  What problems could happen?   Too much bleeding  Blood clots may still form  When do I need to call the doctor?   Tell your doctor about any falls or blows to the head, even if you feel fine. Go to the emergency room to be checked if you are not able to reach your doctor.  If your vomit is bloody or looks like coffee grounds, go to the emergency room if you can't reach your doctor.  Cuts or wounds with bleeding that cannot be controlled with pressure  Bruising more easily than usual  Gums won't stop bleeding with each brushing  Pink or reddish-brown color in the urine  Bowel movements that are red or black like tar or blood in the toilet  Nose bleeds that won't stop with pressure put on the nose  Confusion, feeling dizzy or faint, unusual headaches  If you notice broken blood vessels in the white of the eye  Coughing up blood  For females, heavier than normal menstrual  cycle  Teach Back: Helping You Understand   The Teach Back Method helps you understand the information we are giving you. After you talk with the staff, tell them in your own words what you learned. This helps to make sure the staff has described each thing clearly. It also helps to explain things that may have been confusing. Before going home, make sure you can do these:  I can tell you about my condition.  I can tell you how I will take blood thinners and what I will do to protect myself from bleeding.  I can tell you what changes I need to make with my diet or activities.  I can tell you what I will do if I fall, have bleeding that will not stop, or have too much bruising.  Where can I learn more?   Agency for Healthcare Research and Quality  https://www.ahrq.gov/patients-consumers/diagnosis-treatment/treatments/btpills/btpills.html   Centers for Disease Control and Prevention  https://www.cdc.gov/ncbddd/dvt/understanding-blood-clots-infographic.html   FamilyDoctor.org  https://familydoctor.org/drug-nutrient-interactions-and-drug-supplement-interactions/   [employer]  https://www.[employer]/contents/warfarin-coumadin-beyond-the-basics   Last Reviewed Date   2021-04-21  Consumer Information Use and Disclaimer   This information is not specific medical advice and does not replace information you receive from your health care provider. This is only a brief summary of general information. It does NOT include all information about conditions, illnesses, injuries, tests, procedures, treatments, therapies, discharge instructions or life-style choices that may apply to you. You must talk with your health care provider for complete information about your health and treatment options. This information should not be used to decide whether or not to accept your health care providers advice, instructions or recommendations. Only your health care provider has the knowledge and training to provide advice that is right for  you.   Copyright   Copyright © 2021 [employer] and its affiliates and/or licensors. All rights reserved.

## 2022-08-11 NOTE — PROGRESS NOTES
"Subjective:       Patient ID: Oliva June is a 41 y.o. female.    Vitals:  height is 5' 7.52" (1.715 m) and weight is 63.5 kg (140 lb). Her tympanic temperature is 98.6 °F (37 °C). Her blood pressure is 99/72 and her pulse is 78. Her respiration is 17 and oxygen saturation is 98%.     Chief Complaint: Fall    41 y.o female presents today c/o R leg pain/swelling and head pain.  Patient reports that was trying a mobility scooter and fell four days ago.    Fall  The accident occurred 3 to 6 hours ago. Fall occurred: Trying a new mobility scooter. She landed on concrete. The point of impact was the head (Right lower leg). The pain is present in the right lower leg. The pain is at a severity of 10/10. The pain is severe. The symptoms are aggravated by standing, sitting and movement. Associated symptoms include headaches. Pertinent negatives include no abdominal pain, fever, loss of consciousness, nausea or vomiting. She has tried ice (Advil and Gabapetin) for the symptoms. The treatment provided mild relief.       Constitution: Negative for chills and fever.   HENT: Negative for congestion.    Respiratory: Negative for cough and sputum production.    Gastrointestinal: Negative for abdominal pain, nausea, vomiting, constipation and diarrhea.   Genitourinary: Negative for dysuria, frequency and urgency.   Musculoskeletal: Positive for pain, trauma and back pain (tailbone). Negative for muscle cramps and muscle ache.   Skin: Positive for abrasion. Negative for rash and erythema.   Neurological: Positive for headaches. Negative for loss of consciousness.   Hematologic/Lymphatic: Positive for history of blood clots.       Objective:      Physical Exam   Constitutional: She is oriented to person, place, and time. She appears well-developed.      Comments:Chronically ill, appears older than stated age     HENT:   Head: Normocephalic. Head is with abrasion. Head is without contusion and without laceration.       Ears: "   Right Ear: Tympanic membrane, external ear and ear canal normal.   Left Ear: Tympanic membrane, external ear and ear canal normal.   Oropharyngeal exam not performed due to risk of viral transmission during global pandemic-- risks outweigh benefits of exam    Patient has a large abrasion to the occipital scalp measuring approximately 3.5-4 cm round.  There is hair matted with dried blood over the wound.  There is no surrounding erythema show no in duration no abscess formation there is minimal swelling there is tenderness to palpation      Comments: Oropharyngeal exam not performed due to risk of viral transmission during global pandemic-- risks outweigh benefits of exam    Patient has a large abrasion to the occipital scalp measuring approximately 3.5-4 cm round.  There is hair matted with dried blood over the wound.  There is no surrounding erythema show no in duration no abscess formation there is minimal swelling there is tenderness to palpation  Eyes: Conjunctivae, EOM and lids are normal. Pupils are equal, round, and reactive to light.   Neck: Trachea normal and phonation normal. Neck supple. No spinous process tenderness present. No muscular tenderness present.   Cardiovascular: Normal rate, regular rhythm and normal heart sounds.   Pulmonary/Chest: Effort normal and breath sounds normal. No stridor. No respiratory distress.   Musculoskeletal: Normal range of motion.         General: Normal range of motion.        Legs:       Comments: Patient has a large hematoma present to the anterior proximal right lower leg there is ecchymosis surrounding the entire leg.  There is tenderness to palpation there is no deformity at the knee joint or ankle joint patient has other scattered ecchymoses on both legs in various stages of healing.  The right lower leg is atraumatic and nontender.    Neuro-vascularly intact distal to extremity  Sensation and motor function completely intact  Less than 2 sec capillary refill  present  Palpable 2+ pulse in the DP     Neurological: She is alert and oriented to person, place, and time.   Skin: Skin is warm, dry, intact and no rash. Capillary refill takes less than 2 seconds. No abrasion, No burn, No bruising, No erythema and No ecchymosis   Psychiatric: Her speech is normal and behavior is normal. Judgment and thought content normal.   Nursing note and vitals reviewed.        Assessment:       1. Blunt head trauma, initial encounter    2. Injury of right lower extremity, initial encounter    3. Fall from (nonmotorized) scooter    4. Abrasion of scalp, initial encounter          Plan:         Blunt head trauma, initial encounter    Injury of right lower extremity, initial encounter  -     X-Ray Tibia Fibula 2 View Right; Future; Expected date: 08/11/2022    Fall from (nonmotorized) scooter  -     X-Ray Sacrum And Coccyx; Future; Expected date: 08/11/2022    Abrasion of scalp, initial encounter    Other orders  -     mupirocin (BACTROBAN) 2 % ointment; Apply to affected area 3 times daily  Dispense: 22 g; Refill: 1  -     cephALEXin (KEFLEX) 500 MG capsule; Take 1 capsule (500 mg total) by mouth every 6 (six) hours. for 5 days  Dispense: 20 capsule; Refill: 0        X-Ray Sacrum And Coccyx    Result Date: 8/11/2022  EXAMINATION: XR SACRUM AND COCCYX CLINICAL HISTORY: Fall from scooter (nonmotorized), initial encounter TECHNIQUE: Two or three views of the sacrum and coccyx were performed. COMPARISON: None FINDINGS: No definite fracture or dislocation.  No bone destruction identified.  Bilateral iliac artery stents identified     See above Electronically signed by: Shahriar Cota MD Date:    08/11/2022 Time:    12:57    X-Ray Tibia Fibula 2 View Right    Result Date: 8/11/2022  EXAMINATION: XR TIBIA FIBULA 2 VIEW RIGHT CLINICAL HISTORY: Unspecified injury of right lower leg, initial encounter TECHNIQUE: AP and lateral views of the right tibia and fibula were performed. COMPARISON: None. FINDINGS:  Mild DJD.  No fracture or dislocation.  No bone destruction identified     See above Electronically signed by: Shahriar Cota MD Date:    08/11/2022 Time:    12:53    X-Ray Shoulder 2 or More Views Left    Result Date: 7/21/2022  EXAMINATION: XR SHOULDER COMPLETE 2 OR MORE VIEWS LEFT CLINICAL HISTORY: Pain in left shoulder TECHNIQUE: Two or three views of the left shoulder were performed. COMPARISON: None FINDINGS: Osteopenia.  No acute fracture or dislocation.  Joint spaces are preserved.  No focal soft tissue swelling or soft tissue calcifications.     No acute osseous abnormality. Electronically signed by: Kumar Noguera Date:    07/21/2022 Time:    08:54      Patient Instructions   Patient Education       Go to the Emergency Room if symptoms or condition worsens in any way    Follow up with Primary Care Provider in 5-7 days   If not improved     Head Injury Observation Discharge Instructions   About this topic   You have been asked to help care for someone who has had a head injury. Some signs of a head injury may show up in the first few hours. Others may not show up for days to weeks after the injury. It is important for you to help watch for these signs in the person who had the head injury. These include:  · Trouble walking or talking or not making sense  · Problems with memory or paying attention  · Upset stomach or throwing up  · Trouble sleeping or feeling very sleepy  · Bad headache  · Mood or behavior changes  · Changes in eyesight  · Feeling bothered by noise or light  · Seizures, jerking of their arms, or legs they cannot control     What care is needed at home?   · Ask the doctor what you need to do when you are with the injured person. Make sure you ask questions if you do not understand what the doctor says. This way you will know what you need to do.  · Watch the injured person for the first 12 to 24 hours after they are home. Call the doctor if they have any problems. It is important to make sure the  "injured person is breathing normally, not throwing up, and not moaning while they sleep.  · When you wake the person, ask them questions like, "Who am I?" and "Can you tell me where we are?" This will help you be sure the injured person is able to fully wake up and think clearly. If they cannot answer your questions, or if you see changes in how they look or act, call for help or go to the emergency room.  · Help them to eat a light diet and drink fluids to help with upset stomach.  · It is good for the injured person to rest. They do not have to stay in bed. Light activity is OK. These are things like taking a shower or a walk. They should not do heavy activities like heavy exercise or using exercise machines or a treadmill. They should not do heavy lifting.  · Help the person with the injury to rest their brain. They may need to stay away from things that need a lot of thought or focus. They should have help making important decisions. They should stay away from TV, computers, and video games. Ask the doctor when the person can go back to these things.  · Ask the doctor when it is okay for them to go back to work. Let family, friends, coworkers, and employers know about the injury. They may have problems thinking, remembering, paying attention, and may get upset more easily.  · Watch children closely after a head injury. Let teachers and coaches know about the injury.  · Use over-the-counter drugs for mild headache, like Tylenol, as told by the doctor. Do not take aspirin or ibuprofen as they increase the risk of bleeding.  · No drinking alcohol. This may slow healing, risk another injury, and make it harder to make decisions.  · It is important to help the injured person avoid another head injury. This means that they will need to avoid playing sports, climbing, or other activities where they may hit their head, or heavy work using machines. Ask the doctor when the injured person may go back to these activities. " Ask the doctor when it is okay for them to drive.  What follow-up care is needed?   The injured person may need to go to see rehab experts. They may be able to help get brain function back and help the injured person get better faster.  What drugs may be needed?   The doctor may order drugs to:  · Help with pain  · Help with dizziness  · Help with upset stomach  · Treat or prevent seizures  Ask if the injured person needs help to remember to take their drugs.  What problems could happen?   The injured person may develop problems such as:  · Bleeding or swelling in the brain  · Damage to the brain. This may lead to temporary or permanent changes in mental, physical, and emotional behavior.  · Seizures  · Low mood  · Hearing, smelling, or eye problems  · Memory loss  When do I need to call the doctor?   If you notice the injured person havin. Problems with their brain, like:  ? More confusion or any change in being able to think clearly  ? Not being able to remember things  ? Very sleepy (more than expected) or hard to wake up  ? Behavior changes, like angry outbursts, strange behavior, or thoughts of hurting themselves or others  ? Headache gets worse or feels different or does not get better with over-the-counter drugs  2. Problems with their eyes, ears, or mouth, like:  ? Trouble speaking or slurred speech or not making sense  ? Blurry eyesight, seeing double, or other problems with their eyesight  ? Bleeding or clear liquid drainage from their ears or nose  3. Problems with how they move or feel, like:  ? Upset stomach and throwing up that won't go away  ? Dizziness or fainting  ? Staggering or trouble walking  ? Lack of strength or numbness of an arm, leg, or any part of their body  ? Stiff neck  ? Fever  ? Seizure, jerking of their arms, or legs they cannot control  ? Losing control of their bladder or bowels  Teach Back: Helping You Understand   The Teach Back Method helps you understand the information we  are giving you. After you talk with the staff, tell them in your own words what you learned. This helps to make sure the staff has described each thing clearly. It also helps to explain things that may have been confusing. Before going home, make sure you can do these:  · I can tell you about head injuries.  · I can tell you what I will do to help the injured person rest their brain.  · I can tell you what I will do if the injured person has problems remembering things.  Last Reviewed Date   2020-04-22  Consumer Information Use and Disclaimer   This information is not specific medical advice and does not replace information you receive from your health care provider. This is only a brief summary of general information. It does NOT include all information about conditions, illnesses, injuries, tests, procedures, treatments, therapies, discharge instructions or life-style choices that may apply to you. You must talk with your health care provider for complete information about your health and treatment options. This information should not be used to decide whether or not to accept your health care providers advice, instructions or recommendations. Only your health care provider has the knowledge and training to provide advice that is right for you.  Copyright   Copyright © 2021 [employer] and its affiliates and/or licensors. All rights reserved.      Patient Education        [facility] ED   General Information   You came to the Emergency Department (ED) for an abrasion. An abrasion is when you have cut or scraped off the top layers of skin. Most of the time, you can care for your wound at home. How long it will take to heal is based on how serious the wound is.  What care is needed at home?   · Call your regular doctor to let them know you were in the ED. Make a follow-up appointment if you were told to.  · The doctor may want you to keep your wound covered as it heals. You may want to use a thin layer of  antibiotic ointment to help keep the wound moist. This will also keep the dressing from sticking to the wound.  · After 24 hours, you can gently wash the wound with soap and water. Pat dry and put on a clean dressing.  · Change your dressing at least once a day or if it gets dirty. Gently wash the wound each day.  · Always wash your hands before and after touching the wound.  · Each time you change the dressing, look closely at the wound to be sure it is healing the right way. The wound may have a thin, yellowish discharge, and this is normal.  · Avoid picking the scab or scratching the site which may cause more irritation.  · You may take a shower, but do not soak in water or swim with an open wound. After 2 days, you can use a waterproof bandage for swimming.  When do I need to call the doctor?   · You have a fever of 100.4°F (38°C) or higher or chills.  · Your wound is swollen, red, or warm.  · Your wound has thick yellow or green drainage.  · Your wound has not healed after 10 days.  · You have new or worsening symptoms.  Last Reviewed Date   2021-05-14  Consumer Information Use and Disclaimer   This information is not specific medical advice and does not replace information you receive from your health care provider. This is only a brief summary of general information. It does NOT include all information about conditions, illnesses, injuries, tests, procedures, treatments, therapies, discharge instructions or life-style choices that may apply to you. You must talk with your health care provider for complete information about your health and treatment options. This information should not be used to decide whether or not to accept your health care providers advice, instructions or recommendations. Only your health care provider has the knowledge and training to provide advice that is right for you.  Copyright   Copyright © 2021 [employer] and its affiliates and/or licensors. All rights reserved.      Patient  Education        Coccyx Injury Discharge Instructions   About this topic   A coccyx injury is when you hurt or dislocate your coccyx. The coccyx, or tailbone, is triangle shaped and found at the end of your spine. Muscles and strong bands of tissue join all of these bones together. In most cases, the tailbone curves in just a little bit and it does not move very much.  What care is needed at home?   · Ask your doctor what you need to do when you go home. Make sure you ask questions if you do not understand everything the doctor says.  · Place an ice pack or a bag of frozen peas wrapped in a towel on the painful part. Never put ice right on the skin. Do not leave the ice on more than 10 to 15 minutes at a time.  · Try not to sit for long periods of time, especially on hard surfaces.  · Lean forward when you sit. Shift your weight from side to side.  · Sit on a special cushion with a hole in the middle to take pressure off of the tailbone.  What follow-up care is needed?   Your doctor may ask you to make visits to the office to check on your progress. Be sure to keep these visits. Your doctor may also send you to physical therapy.  What drugs may be needed?   The doctor may order drugs to:  · Help with pain and swelling  · Soften stools  Will physical activity be limited?   You may need to rest for awhile. You should not do physical activity that makes your health problem worse. You may want to refrain from sexual activity until the pain is better. Talk to your doctor if you run, work out, or play sports. You may not be able to do those things until your health problem gets better.  What changes to diet are needed?   Eat a high fiber diet to avoid straining during bowel movements. For example, eat:  · Grains like: Whole grain bread, brown rice, oatmeal, brown rice, quinoa, cereal  · Vegetables like: Broccoli, cabbage, carrots, cauliflower, eggplant, lettuce, peppers, zucchini, squash, pumpkin, asparagus, avocados,  beets, spinach, parsnips. Fresh or frozen is much better for you than canned.  · Fruits like: Apples, berries, cherries, grapes, peaches, pears, pineapple, plums, watermelons, oranges, kiwis, prunes, raisins, bananas, melons. Fresh or frozen fruit is much better for you than juice. There may be added sugar in juice and it may also have less fiber.  Drink 6 to 8 glasses of liquids each day.  What problems could happen?   · Ongoing pain  · Trouble sitting  · Pain during sex or with bowel movements  · Constipation  When do I need to call the doctor?   · Problems having a bowel movement  · Pain that is not relieved with medications, ice, and rest  · Pain that continues to get worse after a week or two  · A loss of sensation or tingling in surrounding areas  Teach Back: Helping You Understand   The Teach Back Method helps you understand the information we are giving you. After you talk with the staff, tell them in your own words what you learned. This helps to make sure the staff has described each thing clearly. It also helps to explain things that may have been confusing. Before going home, make sure you are able to do these:  · I can tell you about my condition.  · I can tell you what may help ease my pain.  · I can tell you what I will do if I have problems having a bowel movement.  Where can I learn more?   [facility] Choices  https://www.nhs.[place]/conditions/tailbone-pain-coccydynia/   Last Reviewed Date   2020-01-14  Consumer Information Use and Disclaimer   This information is not specific medical advice and does not replace information you receive from your health care provider. This is only a brief summary of general information. It does NOT include all information about conditions, illnesses, injuries, tests, procedures, treatments, therapies, discharge instructions or life-style choices that may apply to you. You must talk with your health care provider for complete information about your health and treatment options.  This information should not be used to decide whether or not to accept your health care providers advice, instructions or recommendations. Only your health care provider has the knowledge and training to provide advice that is right for you.  Copyright   Copyright © 2021 [employer] and its affiliates and/or licensors. All rights reserved.   Patient Education       Going Home on Blood Thinners   About this topic   You are on a drug that keeps your blood from clotting normally to prevent blood clots. It may also keep clots you already have from getting bigger and moving to some other part of your body. While you are on this drug you will need to take extra care to keep yourself safe. If you start to bleed, it can be very serious. You should get help right away to stop the bleeding as soon as possible.  Blood thinner drugs are also called anticoagulants or antiplatelets. Some people take blood thinners as a pill. Others take a shot. You may be on blood thinners because you have:  · An abnormal heart rhythm like atrial fibrillation  · Had a heart attack or stroke  · Had a heart defect since you were born  · Had heart valve surgery  · A blood clot somewhere in your body like your leg or lung  · A stent in your heart or brain  What care is needed at home?   1. Ask your doctor what you need to do when you go home. Make sure you understand everything the doctor says. This way you will know what you need to do.  2. Take extra care with all of your drugs.  ? Tell ALL your doctors, dentists, nurses, and pharmacists that you are taking a blood-thinning drug. Carry a card in your wallet or wear a MedicAlert bracelet or necklace. This will help others know to not order a drug that interacts or adds to the blood-thinning drug you are taking.  ? Talk with your doctor before you take drugs like celecoxib, Celebrex, naproxen, Aleve, Naprosyn, ibuprofen, Advil, or Motrin.  ? Make sure your medicine looks the same every time you  pick it up at the pharmacy. Some drugs are different colors based on the strength. Talk to the pharmacist if the color of your drug changes.  ? Talk to your doctor before you take any new drugs, over the counter medicines, vitamins, or supplements. Drugs, vitamins, and supplements can all change how well your anticoagulant works.  3. Take your blood thinner exactly as ordered.  ? Your doctor will order an exact amount of the blood thinner drug for you. You need to know how much you are supposed to take each day and take it at the same time each day.  ? You may want to make a calendar with the dose of the drug on it. Then you can rebekah off each dose as you take it.  ? Use a pill box to help you organize your drugs.  ? Do not skip doses or stop taking this drug without talking to your doctor.  ? Do not double the dose if you miss a dose. Ask your doctor what to do if you miss a dose.  ? Learn how to give yourself a shot if the doctor has ordered the blood thinner in the shot form. You may want your caregiver to learn how to give you a shot instead. Ask about home health nurses to help you with the shots.  4. Protect yourself from bruising and bleeding.  ? Avoid activities or places where you could be bruised, cut, hurt, or fall.  ? Protect your hands from cuts. Take extra care when using knives or tools. Wear gloves when you work in the garden or around the house.  ? Use electric razors when shaving. Avoid using scissors and nail clippers.  ? Brush your teeth gently. Use a toothbrush with soft bristles. This will help to avoid bleeding from your gums.  ? Wear shoes or slippers on your feet at all times.  ? Blow your nose gently.  ? Use a stool softener so you will not have to strain with bowel movements. Do not use an enema or suppositories.  5. If you start to bleed:  ? Apply pressure to the area until the bleeding stops. This will take several minutes longer than usual because of the blood-thinning drug. Ice may also  help. You may need to call for emergency help or go to the nearest emergency room.  ? For a nose bleed, put pressure and ice on the bridge of your nose. If the bleeding does not stop in 5 to 10 minutes or the bleeding is very heavy, go to the nearest emergency room.     What follow-up care is needed?   · If you take Coumadin or warfarin, you will likely need to have lab tests done to make sure your blood is clotting the right way. These tests are very important to help the doctor make sure you are taking the right dose of your blood thinner drug.  · You might need to have other lab tests to make sure your kidneys and liver are working properly.  · Your doctor may ask you to make visits to the office to check on your progress. Be sure to keep these visits.  Will physical activity be limited?   · You may have to limit your activity. Talk with your doctor about what activities are best for you. Ask if it is safe for you to exercise, play sports, or have sex.  · Avoid activities that can cause bruising and bleeding. Avoid climbing on ladders.  · Ask your doctor about driving.  What changes to diet are needed?   What you eat and how much alcohol you drink can affect the levels of some blood thinners. If you take the blood thinner Coumadin or warfarin:  1. Keep the amount of vitamin K you eat in your diet consistent.  ? It is ok to eat foods with vitamin K as long as you eat about the same each day.  2. Know how much vitamin K you are eating each day. Watch your portion size.  3. Do not all of a sudden change how much vitamin K you are eating.  4. Avoid multivitamins, natural products, and dietary supplements that have added vitamin K.  5. Talk with your doctor or a dietitian to learn more about foods that have Vitamin K in them.  6. Drinking alcohol can affect how your blood clots and how your warfarin works. You may need more blood tests to check your warfarin levels if you drink alcohol. Talk with your doctor about how  much alcohol you drink and how often your drink alcohol.  What problems could happen?   · Too much bleeding  · Blood clots may still form  When do I need to call the doctor?   · Tell your doctor about any falls or blows to the head, even if you feel fine. Go to the emergency room to be checked if you are not able to reach your doctor.  · If your vomit is bloody or looks like coffee grounds, go to the emergency room if you can't reach your doctor.  · Cuts or wounds with bleeding that cannot be controlled with pressure  · Bruising more easily than usual  · Gums won't stop bleeding with each brushing  · Pink or reddish-brown color in the urine  · Bowel movements that are red or black like tar or blood in the toilet  · Nose bleeds that won't stop with pressure put on the nose  · Confusion, feeling dizzy or faint, unusual headaches  · If you notice broken blood vessels in the white of the eye  · Coughing up blood  · For females, heavier than normal menstrual cycle  Teach Back: Helping You Understand   The Teach Back Method helps you understand the information we are giving you. After you talk with the staff, tell them in your own words what you learned. This helps to make sure the staff has described each thing clearly. It also helps to explain things that may have been confusing. Before going home, make sure you can do these:  · I can tell you about my condition.  · I can tell you how I will take blood thinners and what I will do to protect myself from bleeding.  · I can tell you what changes I need to make with my diet or activities.  · I can tell you what I will do if I fall, have bleeding that will not stop, or have too much bruising.  Where can I learn more?   Agency for Healthcare Research and Quality  https://www.ahrq.gov/patients-consumers/diagnosis-treatment/treatments/btpills/btpills.html   Centers for Disease Control and Prevention  https://www.cdc.gov/ncbddd/dvt/understanding-blood-clots-infographic.html    FamilyDoctor.org  https://familydoctor.org/drug-nutrient-interactions-and-drug-supplement-interactions/   UpToDate  https://www.[employer]Heroes[employer]u/contents/warfarin-coumadin-beyond-the-basics   Last Reviewed Date   2021-04-21  Consumer Information Use and Disclaimer   This information is not specific medical advice and does not replace information you receive from your health care provider. This is only a brief summary of general information. It does NOT include all information about conditions, illnesses, injuries, tests, procedures, treatments, therapies, discharge instructions or life-style choices that may apply to you. You must talk with your health care provider for complete information about your health and treatment options. This information should not be used to decide whether or not to accept your health care providers advice, instructions or recommendations. Only your health care provider has the knowledge and training to provide advice that is right for you.   Copyright   Copyright © 2021 UpToDate, Inc. and its affiliates and/or licensors. All rights reserved.

## 2022-11-11 ENCOUNTER — OFFICE VISIT (OUTPATIENT)
Dept: DERMATOLOGY | Facility: CLINIC | Age: 42
End: 2022-11-11
Payer: MEDICARE

## 2022-11-11 DIAGNOSIS — L85.8 KERATOSIS PILARIS: ICD-10-CM

## 2022-11-11 DIAGNOSIS — L21.9 SEBORRHEIC DERMATITIS: ICD-10-CM

## 2022-11-11 DIAGNOSIS — L81.1 MELASMA: Primary | ICD-10-CM

## 2022-11-11 PROCEDURE — 99999 PR PBB SHADOW E&M-EST. PATIENT-LVL II: ICD-10-PCS | Mod: PBBFAC,,, | Performed by: DERMATOLOGY

## 2022-11-11 PROCEDURE — 1160F RVW MEDS BY RX/DR IN RCRD: CPT | Mod: CPTII,S$GLB,, | Performed by: DERMATOLOGY

## 2022-11-11 PROCEDURE — 1160F PR REVIEW ALL MEDS BY PRESCRIBER/CLIN PHARMACIST DOCUMENTED: ICD-10-PCS | Mod: CPTII,S$GLB,, | Performed by: DERMATOLOGY

## 2022-11-11 PROCEDURE — 99999 PR PBB SHADOW E&M-EST. PATIENT-LVL II: CPT | Mod: PBBFAC,,, | Performed by: DERMATOLOGY

## 2022-11-11 PROCEDURE — 1159F MED LIST DOCD IN RCRD: CPT | Mod: CPTII,S$GLB,, | Performed by: DERMATOLOGY

## 2022-11-11 PROCEDURE — 99204 OFFICE O/P NEW MOD 45 MIN: CPT | Mod: S$GLB,,, | Performed by: DERMATOLOGY

## 2022-11-11 PROCEDURE — 1159F PR MEDICATION LIST DOCUMENTED IN MEDICAL RECORD: ICD-10-PCS | Mod: CPTII,S$GLB,, | Performed by: DERMATOLOGY

## 2022-11-11 PROCEDURE — 99204 PR OFFICE/OUTPT VISIT, NEW, LEVL IV, 45-59 MIN: ICD-10-PCS | Mod: S$GLB,,, | Performed by: DERMATOLOGY

## 2022-11-11 RX ORDER — TRETINOIN 1 MG/G
CREAM TOPICAL
Qty: 20 G | Refills: 6 | Status: SHIPPED | OUTPATIENT
Start: 2022-11-11

## 2022-11-11 RX ORDER — HYDROQUINONE 40 MG/G
CREAM TOPICAL
Qty: 28.35 G | Refills: 3 | Status: SHIPPED | OUTPATIENT
Start: 2022-11-11

## 2022-11-11 NOTE — PROGRESS NOTES
Subjective:       Patient ID:  Oliva June is a 41 y.o. female who presents for   Chief Complaint   Patient presents with    Hyperpigmentation     face     Previous visit with Dr Padron [facility] for pigment on face, seborrheic dermatitis eyebrows and nasolabial, and keratosis pilaris arms.  Was given ketoconizole and tac cream for the seb derm, also was prescribed tretinoin but was not filled.  Would like tx for the pigment on her face, she is using sunscreen q am.     Current Outpatient Medications:   ·  albuterol (ACCUNEB) 0.63 mg/3 mL Nebu, Take 0.63 mg by nebulization every 6 (six) hours as needed. Rescue, Disp: , Rfl:   ·  apixaban (ELIQUIS) 5 mg Tab, Take 5 mg by mouth 2 (two) times daily., Disp: , Rfl:   ·  apixaban (ELIQUIS) 5 mg Tab, Take 5 mg by mouth 2 (two) times daily., Disp: , Rfl:   ·  baclofen (LIORESAL) 20 MG tablet, Take 10 mg by mouth 4 (four) times daily. , Disp: , Rfl:   ·  ciclopirox (LOPROX) 0.77 % Crea, Apply topically 2 (two) times daily., Disp: , Rfl:   ·  clotrimazole (LOTRIMIN) 1 % cream, , Disp: , Rfl:   ·  dalfampridine 10 mg Tb12, Take by mouth after dinner. , Disp: , Rfl:   ·  diazePAM (VALIUM) 10 MG Tab, Take 5 mg by mouth 2 (two) times daily as needed. , Disp: , Rfl:   ·  dicyclomine (BENTYL) 10 MG capsule, TAKE 1 CAPSULE BY MOUTH FOUR TIMES DAILY BEFORE MEALS AND AT NIGHT, Disp: , Rfl:   ·  escitalopram oxalate (LEXAPRO) 10 MG tablet, Take 10 mg by mouth once daily., Disp: , Rfl:   ·  GABAPENTIN ORAL, Take 600 capsules by mouth after dinner. , Disp: , Rfl:   ·  GLATOPA 40 mg/mL injection, Inject into the   ·  hydrOXYzine HCL (ATARAX) 10 MG Tab, , Disp: , Rfl:   ·  ibuprofen (ADVIL,MOTRIN) 800 MG tablet, Take 800 mg by mouth 3 (three) times daily., Disp: , Rfl:   ·  levothyroxine (SYNTHROID) 112 MCG tablet, Take 112 mcg by mouth before breakfast., Disp: , Rfl:   ·  montelukast (SINGULAIR) 10 mg tablet, Take 10 mg by mouth every evening., Disp: , Rfl:   ·  mupirocin (BACTROBAN) 2  % ointment, Apply to affected area 3 times daily, Disp: 22 g, Rfl: 1  ·  olopatadine (PATANASE) 0.6 % nasal spray, by Each Nostril route., Disp: , Rfl:   ·  oxybutynin (DITROPAN-XL) 10 MG 24 hr tablet, , Disp: , Rfl:   ·  oxyCODONE-acetaminophen (PERCOCET) 5-325 mg per tablet, Take 1 tablet by mouth every 4 (four) hours as needed for Pain., Disp: 15 tablet, Rfl: 0  ·  pantoprazole (PROTONIX) 20 MG tablet, Take 20 mg by mouth 2 (two) times daily before meals. , Disp: , Rfl:       Rash - Initial  Affected locations: face  Signs / symptoms: asymptomatic, itching and scaling  Aggravated by: nothing  Relieving factors/Treatments tried: nothing    Review of Systems   Constitutional:  Negative for fever, chills, weight loss, weight gain, fatigue, night sweats and malaise.   Skin:  Positive for rash. Negative for daily sunscreen use and activity-related sunscreen use.   Hematologic/Lymphatic: Bruises/bleeds easily.      Objective:    Physical Exam   Constitutional: She appears well-developed and well-nourished.   Neurological: She is alert and oriented to person, place, and time.   Psychiatric: She has a normal mood and affect.   Skin:   Areas Examined (abnormalities noted in diagram):   Head / Face Inspection Performed            Diagram Legend     Erythematous scaling macule/papule c/w actinic keratosis       Vascular papule c/w angioma      Pigmented verrucoid papule/plaque c/w seborrheic keratosis      Yellow umbilicated papule c/w sebaceous hyperplasia      Irregularly shaped tan macule c/w lentigo     1-2 mm smooth white papules consistent with Milia      Movable subcutaneous cyst with punctum c/w epidermal inclusion cyst      Subcutaneous movable cyst c/w pilar cyst      Firm pink to brown papule c/w dermatofibroma      Pedunculated fleshy papule(s) c/w skin tag(s)      Evenly pigmented macule c/w junctional nevus     Mildly variegated pigmented, slightly irregular-bordered macule c/w mildly atypical nevus      Flesh  colored to evenly pigmented papule c/w intradermal nevus       Pink pearly papule/plaque c/w basal cell carcinoma      Erythematous hyperkeratotic cursted plaque c/w SCC      Surgical scar with no sign of skin cancer recurrence      Open and closed comedones      Inflammatory papules and pustules      Verrucoid papule consistent consistent with wart     Erythematous eczematous patches and plaques     Dystrophic onycholytic nail with subungual debris c/w onychomycosis     Umbilicated papule    Erythematous-base heme-crusted tan verrucoid plaque consistent with inflamed seborrheic keratosis     Erythematous Silvery Scaling Plaque c/w Psoriasis     See annotation      Assessment / Plan:        Melasma  -     tretinoin (RETIN-A) 0.1 % cream; Use small amount hs on face  Dispense: 20 g; Refill: 6  -     hydroquinone 4 % Crea; Use hs for dark spots  Dispense: 28.35 g; Refill: 3  Cont sunscreen qd    Seborrheic dermatitis by hx  Cont ketoconizole cream  DC tac cream on face    Keratosis pilaris by hx  Can use tac cream prn flares           Follow up if symptoms worsen or fail to improve.

## 2022-12-01 ENCOUNTER — PATIENT MESSAGE (OUTPATIENT)
Dept: PSYCHIATRY | Facility: CLINIC | Age: 42
End: 2022-12-01
Payer: MEDICARE

## 2023-01-13 ENCOUNTER — OFFICE VISIT (OUTPATIENT)
Dept: PODIATRY | Facility: CLINIC | Age: 43
End: 2023-01-13
Payer: MEDICARE

## 2023-01-13 VITALS
HEART RATE: 96 BPM | DIASTOLIC BLOOD PRESSURE: 102 MMHG | BODY MASS INDEX: 15.29 KG/M2 | HEIGHT: 68 IN | WEIGHT: 100.88 LBS | SYSTOLIC BLOOD PRESSURE: 138 MMHG

## 2023-01-13 DIAGNOSIS — B35.1 ONYCHOMYCOSIS DUE TO DERMATOPHYTE: Primary | ICD-10-CM

## 2023-01-13 DIAGNOSIS — Z79.01 LONG TERM CURRENT USE OF ANTICOAGULANT THERAPY: ICD-10-CM

## 2023-01-13 PROCEDURE — 3080F DIAST BP >= 90 MM HG: CPT | Mod: CPTII,S$GLB,, | Performed by: PODIATRIST

## 2023-01-13 PROCEDURE — 3008F BODY MASS INDEX DOCD: CPT | Mod: CPTII,S$GLB,, | Performed by: PODIATRIST

## 2023-01-13 PROCEDURE — 99203 PR OFFICE/OUTPT VISIT, NEW, LEVL III, 30-44 MIN: ICD-10-PCS | Mod: 25,S$GLB,, | Performed by: PODIATRIST

## 2023-01-13 PROCEDURE — 11721 DEBRIDE NAIL 6 OR MORE: CPT | Mod: Q9,S$GLB,, | Performed by: PODIATRIST

## 2023-01-13 PROCEDURE — 3080F PR MOST RECENT DIASTOLIC BLOOD PRESSURE >= 90 MM HG: ICD-10-PCS | Mod: CPTII,S$GLB,, | Performed by: PODIATRIST

## 2023-01-13 PROCEDURE — 1160F PR REVIEW ALL MEDS BY PRESCRIBER/CLIN PHARMACIST DOCUMENTED: ICD-10-PCS | Mod: CPTII,S$GLB,, | Performed by: PODIATRIST

## 2023-01-13 PROCEDURE — 3075F PR MOST RECENT SYSTOLIC BLOOD PRESS GE 130-139MM HG: ICD-10-PCS | Mod: CPTII,S$GLB,, | Performed by: PODIATRIST

## 2023-01-13 PROCEDURE — 1159F PR MEDICATION LIST DOCUMENTED IN MEDICAL RECORD: ICD-10-PCS | Mod: CPTII,S$GLB,, | Performed by: PODIATRIST

## 2023-01-13 PROCEDURE — 3008F PR BODY MASS INDEX (BMI) DOCUMENTED: ICD-10-PCS | Mod: CPTII,S$GLB,, | Performed by: PODIATRIST

## 2023-01-13 PROCEDURE — 11721 PR DEBRIDEMENT OF NAILS, 6 OR MORE: ICD-10-PCS | Mod: Q9,S$GLB,, | Performed by: PODIATRIST

## 2023-01-13 PROCEDURE — 1159F MED LIST DOCD IN RCRD: CPT | Mod: CPTII,S$GLB,, | Performed by: PODIATRIST

## 2023-01-13 PROCEDURE — 3075F SYST BP GE 130 - 139MM HG: CPT | Mod: CPTII,S$GLB,, | Performed by: PODIATRIST

## 2023-01-13 PROCEDURE — 99203 OFFICE O/P NEW LOW 30 MIN: CPT | Mod: 25,S$GLB,, | Performed by: PODIATRIST

## 2023-01-13 PROCEDURE — 1160F RVW MEDS BY RX/DR IN RCRD: CPT | Mod: CPTII,S$GLB,, | Performed by: PODIATRIST

## 2023-01-13 PROCEDURE — 99999 PR PBB SHADOW E&M-EST. PATIENT-LVL IV: ICD-10-PCS | Mod: PBBFAC,,, | Performed by: PODIATRIST

## 2023-01-13 PROCEDURE — 99999 PR PBB SHADOW E&M-EST. PATIENT-LVL IV: CPT | Mod: PBBFAC,,, | Performed by: PODIATRIST

## 2023-01-13 RX ORDER — CICLOPIROX 80 MG/ML
SOLUTION TOPICAL NIGHTLY
Qty: 6.6 ML | Refills: 11 | Status: SHIPPED | OUTPATIENT
Start: 2023-01-13

## 2023-01-13 NOTE — PROGRESS NOTES
Subjective:      Patient ID: Oliva June is a 42 y.o. female.    Chief Complaint: Nail Problem    Heart permanent dystrophic toenails all 10 toes with pain.  Gradual onset, worsening over the past several weeks.  Aggravated by increased weight-bearing prolonged standing some shoes.  No prior medical treatment.  No self-treatment.  Patient denies trauma and surgery both feet.    Review of Systems   Constitutional: Negative for chills, diaphoresis, fever, malaise/fatigue and night sweats.   Cardiovascular:  Negative for claudication, cyanosis, leg swelling and syncope.   Skin:  Positive for nail changes. Negative for color change, dry skin, rash, suspicious lesions and unusual hair distribution.   Musculoskeletal:  Negative for falls, joint pain, joint swelling, muscle cramps, muscle weakness and stiffness.   Gastrointestinal:  Negative for constipation, diarrhea, nausea and vomiting.   Neurological:  Positive for paresthesias and sensory change. Negative for brief paralysis, disturbances in coordination, focal weakness, numbness and tremors.         Objective:      Physical Exam  Constitutional:       General: She is not in acute distress.     Appearance: She is well-developed. She is not diaphoretic.   Cardiovascular:      Pulses:           Popliteal pulses are 1+ on the right side and 1+ on the left side.        Dorsalis pedis pulses are 1+ on the right side and 1+ on the left side.        Posterior tibial pulses are 2+ on the right side and 2+ on the left side.      Comments: Capillary refill 3 seconds all toes/distal feet, all toes/both feet warm to touch.      Negative lymphadenopathy bilateral popliteal fossa and tarsal tunnel.      Negavie lower extremity edema bilateral.    Musculoskeletal:      Right ankle: No swelling, deformity, ecchymosis or lacerations. Normal range of motion. Normal pulse.      Right Achilles Tendon: Normal. No defects. Ayers's test negative.      Comments: Generalized decreased  strength age and condition appropriate.  Walks with walker for stability.   Lymphadenopathy:      Lower Body: No right inguinal adenopathy. No left inguinal adenopathy.      Comments: Negative lymphadenopathy bilateral popliteal fossa and tarsal tunnel.    Negative lymphangitic streaking bilateral feet/ankles/legs.   Skin:     General: Skin is warm and dry.      Capillary Refill: Capillary refill takes 2 to 3 seconds.      Coloration: Skin is not pale.      Findings: No abrasion, bruising, burn, ecchymosis, erythema, laceration, lesion or rash.      Nails: There is no clubbing.      Comments:   Skin thin, shiny, atrophic, with decreased density and distribution of pedal hair bilateral, but without hyperpigmentation, elton discoloration,  ulcers, masses, nodules or cords palpated bilateral feet and legs.    Toenails 1st, 2nd, 3rd, 4th, 5th  bilateral are hypertrophic thickened 2-3 mm, dystrophic, discolored tanish brown with tan, gray crumbly subungual debris.  Tender to distal nail plate pressure, without periungual skin abnormality of each.     Neurological:      Mental Status: She is alert and oriented to person, place, and time.      Sensory: No sensory deficit.      Motor: No tremor, atrophy or abnormal muscle tone.      Gait: Gait normal.      Comments: Paresthesias, and burning bilateral feet with no clearly identified trigger or source.    Negative allodynia both feet   Psychiatric:         Behavior: Behavior is cooperative.           Assessment:       Encounter Diagnoses   Name Primary?    Onychomycosis due to dermatophyte Yes    Long term current use of anticoagulant therapy          Plan:       Oliva was seen today for nail problem.    Diagnoses and all orders for this visit:    Onychomycosis due to dermatophyte    Long term current use of anticoagulant therapy    Other orders  -     ciclopirox (PENLAC) 8 % Soln; Apply topically nightly.      I counseled the patient on her conditions, their  implications and medical management.        With the patient's permission, I debrided all ten toenails with a sterile nipper and curette, removing all offending nail and debris.  Patient tolerated the procedure well and related significant relief.    Discussed conservative treatment with shoes of adequate dimensions, material, and style to alleviate symptoms and delay or prevent surgical intervention.    Penlac            Follow up if symptoms worsen or fail to improve.

## 2023-02-20 ENCOUNTER — PATIENT MESSAGE (OUTPATIENT)
Dept: PSYCHIATRY | Facility: CLINIC | Age: 43
End: 2023-02-20
Payer: MEDICARE

## 2023-02-27 ENCOUNTER — TELEPHONE (OUTPATIENT)
Dept: GASTROENTEROLOGY | Facility: CLINIC | Age: 43
End: 2023-02-27
Payer: MEDICARE

## 2023-02-27 NOTE — TELEPHONE ENCOUNTER
Left message for patient to call the office.    ----- Message from Virgen Salinas sent at 2/24/2023  4:58 PM CST -----  Type:  Appointment Request    Name of Caller:VALENTINA ALVAREZ [[id number]]  When is the first available appointment?No access  Symptoms:Crohn's disease   Would the patient rather a call back or a response via [facility]? Call back  Best Call Back Number:[phone]  Additional Information: Patient indicates she was not aware of her appointment  on 2/23/23. Patient indicates she would like to schedule for the soonest appointment available. Patient indicates she has been struggling severely with her crohn's disease recently and would like to be seen soon. Patient indicates she would like a call back with further assistance.

## 2023-02-27 NOTE — TELEPHONE ENCOUNTER
----- Message from Pedrochristos Silvary sent at 2/24/2023  5:14 PM CST -----  Type:  Sooner Apoointment Request    Caller is requesting a sooner appointment.  Caller declined first available appointment listed below.  Caller will not accept being placed on the waitlist and is requesting a message be sent to doctor.  Name of Caller:pt   When is the first available appointment?none  Symptoms:crohn's disease   Would the patient rather a call back or a response via [facility]? call  Best Call Back Number:[phone]  Additional Information: pt states that she needs an provider who specializes with Crohn's Disease. Please assist  pt with being seen

## 2023-03-13 ENCOUNTER — TELEPHONE (OUTPATIENT)
Dept: INTERNAL MEDICINE | Facility: CLINIC | Age: 43
End: 2023-03-13
Payer: MEDICARE

## 2023-03-13 ENCOUNTER — PATIENT MESSAGE (OUTPATIENT)
Dept: RHEUMATOLOGY | Facility: CLINIC | Age: 43
End: 2023-03-13
Payer: MEDICARE

## 2023-03-13 NOTE — TELEPHONE ENCOUNTER
----- Message from [facility] sent at 3/13/2023  2:35 PM CDT -----  Type: Patient Call Back         Who called: Pt          What is the request in detail: Pt called in regarding requesting blood work to schedule before up coming appointment  on 3/31/23          Can the clinic reply by [facility]? No          Would the patient rather a call back or a response via [facility]? Call back or send message to the portal          Best call back number: [phone] (mobile)          Additional Information:           Thank You

## 2023-03-13 NOTE — TELEPHONE ENCOUNTER
Spoke w/ pt and notified her that the provider does not place lab orders before the scheduled visit. Pt understood.

## 2023-03-30 ENCOUNTER — TELEPHONE (OUTPATIENT)
Dept: INTERNAL MEDICINE | Facility: CLINIC | Age: 43
End: 2023-03-30
Payer: MEDICARE

## 2023-03-30 NOTE — TELEPHONE ENCOUNTER
Attempted to call pt to reschedule appt  Left message to call back  Will also send portal message  Dr rojas is not   Schedule was opened in error

## 2023-03-31 ENCOUNTER — TELEPHONE (OUTPATIENT)
Dept: FAMILY MEDICINE | Facility: CLINIC | Age: 43
End: 2023-03-31
Payer: MEDICARE

## 2023-03-31 NOTE — TELEPHONE ENCOUNTER
Called patient and answered patient questions and informed patient Dr. Shoemaker is an doctor. Patient states she is looking for PCP informed patient she can go to [place] or [facility].

## 2023-03-31 NOTE — TELEPHONE ENCOUNTER
----- Message from Grace Busch sent at 3/31/2023 10:51 AM CDT -----  Contact: Pt [phone]  The patient wants to know if you are a resident doctor or not.    Thank you

## 2023-04-18 ENCOUNTER — TELEPHONE (OUTPATIENT)
Dept: GASTROENTEROLOGY | Facility: HOSPITAL | Age: 43
End: 2023-04-18
Payer: MEDICARE

## 2023-04-19 ENCOUNTER — TELEPHONE (OUTPATIENT)
Dept: GASTROENTEROLOGY | Facility: CLINIC | Age: 43
End: 2023-04-19
Payer: MEDICARE

## 2023-04-19 NOTE — TELEPHONE ENCOUNTER
LM to return call [phone]   
Patient asked one question and began telling whole story about her illness and experiences and personal needs  -refocused the patient was able to get through NP questions  -explained NP process  - will reach back out to schedule at later date  
29-Nov-2017 06:05

## 2023-04-19 NOTE — TELEPHONE ENCOUNTER
Pt is scheduled 5/23.  She is prior pt at [facility], dr bey. And jose a yung.    She is requesting an IBD specialist    Can she be seen in our IBD clinic?

## 2023-05-08 ENCOUNTER — OFFICE VISIT (OUTPATIENT)
Dept: PAIN MEDICINE | Facility: CLINIC | Age: 43
End: 2023-05-08
Payer: MEDICARE

## 2023-05-08 VITALS
BODY MASS INDEX: 15.85 KG/M2 | HEART RATE: 75 BPM | RESPIRATION RATE: 19 BRPM | SYSTOLIC BLOOD PRESSURE: 118 MMHG | DIASTOLIC BLOOD PRESSURE: 82 MMHG | WEIGHT: 101 LBS | HEIGHT: 67 IN | TEMPERATURE: 98 F

## 2023-05-08 DIAGNOSIS — M79.2 NEUROPATHIC PAIN: Primary | ICD-10-CM

## 2023-05-08 DIAGNOSIS — R53.81 PHYSICAL DECONDITIONING: ICD-10-CM

## 2023-05-08 DIAGNOSIS — G35 MS (MULTIPLE SCLEROSIS): ICD-10-CM

## 2023-05-08 PROCEDURE — 3074F SYST BP LT 130 MM HG: CPT | Mod: CPTII,S$GLB,, | Performed by: ANESTHESIOLOGY

## 2023-05-08 PROCEDURE — 1160F RVW MEDS BY RX/DR IN RCRD: CPT | Mod: CPTII,S$GLB,, | Performed by: ANESTHESIOLOGY

## 2023-05-08 PROCEDURE — 99205 PR OFFICE/OUTPT VISIT, NEW, LEVL V, 60-74 MIN: ICD-10-PCS | Mod: S$GLB,,, | Performed by: ANESTHESIOLOGY

## 2023-05-08 PROCEDURE — 3008F PR BODY MASS INDEX (BMI) DOCUMENTED: ICD-10-PCS | Mod: CPTII,S$GLB,, | Performed by: ANESTHESIOLOGY

## 2023-05-08 PROCEDURE — 99999 PR PBB SHADOW E&M-EST. PATIENT-LVL V: CPT | Mod: PBBFAC,,, | Performed by: ANESTHESIOLOGY

## 2023-05-08 PROCEDURE — 3008F BODY MASS INDEX DOCD: CPT | Mod: CPTII,S$GLB,, | Performed by: ANESTHESIOLOGY

## 2023-05-08 PROCEDURE — 99205 OFFICE O/P NEW HI 60 MIN: CPT | Mod: S$GLB,,, | Performed by: ANESTHESIOLOGY

## 2023-05-08 PROCEDURE — 1159F PR MEDICATION LIST DOCUMENTED IN MEDICAL RECORD: ICD-10-PCS | Mod: CPTII,S$GLB,, | Performed by: ANESTHESIOLOGY

## 2023-05-08 PROCEDURE — 1159F MED LIST DOCD IN RCRD: CPT | Mod: CPTII,S$GLB,, | Performed by: ANESTHESIOLOGY

## 2023-05-08 PROCEDURE — 3079F PR MOST RECENT DIASTOLIC BLOOD PRESSURE 80-89 MM HG: ICD-10-PCS | Mod: CPTII,S$GLB,, | Performed by: ANESTHESIOLOGY

## 2023-05-08 PROCEDURE — 3074F PR MOST RECENT SYSTOLIC BLOOD PRESSURE < 130 MM HG: ICD-10-PCS | Mod: CPTII,S$GLB,, | Performed by: ANESTHESIOLOGY

## 2023-05-08 PROCEDURE — 99999 PR PBB SHADOW E&M-EST. PATIENT-LVL V: ICD-10-PCS | Mod: PBBFAC,,, | Performed by: ANESTHESIOLOGY

## 2023-05-08 PROCEDURE — 1160F PR REVIEW ALL MEDS BY PRESCRIBER/CLIN PHARMACIST DOCUMENTED: ICD-10-PCS | Mod: CPTII,S$GLB,, | Performed by: ANESTHESIOLOGY

## 2023-05-08 PROCEDURE — 3079F DIAST BP 80-89 MM HG: CPT | Mod: CPTII,S$GLB,, | Performed by: ANESTHESIOLOGY

## 2023-05-08 RX ORDER — PREGABALIN 100 MG/1
100 CAPSULE ORAL 3 TIMES DAILY
Qty: 90 CAPSULE | Refills: 1 | Status: SHIPPED | OUTPATIENT
Start: 2023-05-08 | End: 2023-12-05

## 2023-05-08 NOTE — PROGRESS NOTES
PCP: [facility]    REFERRING PHYSICIAN:     CHIEF COMPLAINT: Leg pain, hand pain    Original HISTORY OF PRESENT ILLNESS: Oliva June presents to the clinic for the evaluation of the above pain. The pain started approximately 5 years ago. She was slapping her foot on the ground while walking. She eventually fell down at work due to leg dysfunction and was diagnosed with MS at [facility].     Original Pain Description:  The pain is located below the knees bilaterally and does not radiate. The pain is described as burning. Exacerbating factors: nothing. Mitigating factors nothing. Symptoms interfere with daily activity. The patient feels like symptoms have been unchanged.     Original PAIN SCORES:  Best: Pain is 6  Worst: Pain is 10  Current: Pain is 7    INTERVAL HISTORY:     6 weeks of Conservative therapy:  PT:  - Home PT/OT for 6 months  Chiro:  HEP:       Treatments / Medications: (Ice/Heat/NSAIDS/APAP/etc):  Gabapentin 600 QID  Valium - for left leg/arm tremors    Interventional Pain Procedures: (Previous injections)  No    Past Medical History:   Diagnosis Date    Anticoagulant long-term use     Anxiety     Asthma     Crohn disease     Depression     DVT (deep venous thrombosis)     Encounter for blood transfusion     Insomnia     May-Thurner syndrome     abdominal stents    Multiple sclerosis     Neuropathy      Past Surgical History:   Procedure Laterality Date    HYSTERECTOMY      REMOVAL OF TRACHEOSTOMY TUBE      THYROIDECTOMY      TONSILLECTOMY      TRACHEOTOMY      only for 2 weeks    TURBINOPLASTY       Social History     Socioeconomic History    Marital status: Single   Tobacco Use    Smoking status: Former     Types: Cigarettes     Quit date: 2019     Years since quittin.2    Smokeless tobacco: Former   Substance and Sexual Activity    Alcohol use: Yes     Comment: occasional    Drug use: Never     History reviewed. No pertinent family history.    Review of  patient's allergies indicates:   Allergen Reactions    Codeine Other (See Comments)     Heartburn    Sulfa (sulfonamide antibiotics) Itching    Vicodin [hydrocodone-acetaminophen] Hives and Itching     Can take percocet    Azithromycin Other (See Comments)     Skin melting    Chlorhexidine gluconate Itching    Macrolide antibiotics Anxiety and Hallucinations       Current Outpatient Medications   Medication Sig    albuterol (ACCUNEB) 0.63 mg/3 mL Nebu Take 0.63 mg by nebulization every 6 (six) hours as needed. Rescue    apixaban (ELIQUIS) 5 mg Tab Take 5 mg by mouth 2 (two) times daily.    apixaban (ELIQUIS) 5 mg Tab Take 5 mg by mouth 2 (two) times daily.    baclofen (LIORESAL) 20 MG tablet Take 10 mg by mouth 4 (four) times daily.     ciclopirox (LOPROX) 0.77 % Crea Apply topically 2 (two) times daily.    ciclopirox (PENLAC) 8 % Soln Apply topically nightly.    clotrimazole (LOTRIMIN) 1 % cream     dalfampridine 10 mg Tb12 Take by mouth after dinner.     diazePAM (VALIUM) 10 MG Tab Take 5 mg by mouth 2 (two) times daily as needed.     dicyclomine (BENTYL) 10 MG capsule TAKE 1 CAPSULE BY MOUTH FOUR TIMES DAILY BEFORE MEALS AND AT NIGHT    escitalopram oxalate (LEXAPRO) 10 MG tablet Take 10 mg by mouth once daily.    GABAPENTIN ORAL Take 600 capsules by mouth after dinner.     hydroquinone 4 % Crea Use hs for dark spots    hydrOXYzine HCL (ATARAX) 10 MG Tab     ibuprofen (ADVIL,MOTRIN) 800 MG tablet Take 800 mg by mouth 3 (three) times daily.    levothyroxine (SYNTHROID) 112 MCG tablet Take 112 mcg by mouth before breakfast.    montelukast (SINGULAIR) 10 mg tablet Take 10 mg by mouth every evening.    mupirocin (BACTROBAN) 2 % ointment Apply to affected area 3 times daily    olopatadine (PATANASE) 0.6 % nasal spray by Each Nostril route.    oxybutynin (DITROPAN-XL) 10 MG 24 hr tablet     oxyCODONE-acetaminophen (PERCOCET) 5-325 mg per tablet Take 1 tablet by mouth every 4 (four) hours as needed for Pain.     "pantoprazole (PROTONIX) 20 MG tablet Take 20 mg by mouth 2 (two) times daily before meals.     tretinoin (RETIN-A) 0.1 % cream Use small amount hs on face    GLATOPA 40 mg/mL injection Inject into the skin.    pregabalin (LYRICA) 100 MG capsule Take 1 capsule (100 mg total) by mouth 3 (three) times daily.     No current facility-administered medications for this visit.       ROS:  GENERAL: No fever. No chills. No fatigue. Denies weight loss. Denies weight gain.  HEENT: Denies headaches. Denies vision change. Denies eye pain. Denies double vision. Denies ear pain.   CV: Denies chest pain.   PULM: Denies of shortness of breath.  GI: Denies constipation. No diarrhea. No abdominal pain. Denies nausea. Denies vomiting. No blood in stool.  HEME: Denies bleeding problems.  : Denies urgency. No painful urination. No blood in urine.  MS: + Lt shoulder pain. Denies joint swelling.  + back pain.  SKIN: Denies rash.   NEURO: Denies seizures. No weakness.  PSYCH:  + difficulty sleeping. No anxiety. Denies depression. No suicidal thoughts.       VITALS:   Vitals:    05/08/23 1501   BP: 118/82   Pulse: 75   Resp: 19   Temp: 98.2 °F (36.8 °C)   TempSrc: Oral   Weight: 45.8 kg (100 lb 15.5 oz)   Height: 5' 7" (1.702 m)   PainSc:   7   PainLoc: Foot         PHYSICAL EXAM:   GENERAL: Well appearing, in no acute distress, alert and oriented x3.  PSYCH:  Mood and affect appropriate.  SKIN: Skin color, texture, turgor normal, no rashes or lesions.  HEENT:  Normocephalic, atraumatic. Cranial nerves grossly intact.  PULM: No evidence of respiratory difficulty, symmetric chest rise.  GI:  Non-distended  BACK: Normal range of motion.   EXTREMITIES: No deformities, edema, or skin discoloration.   MUSCULOSKELETAL: Atrophy of left deltoid. Pain with palpation of each AC joint. Pain to palpation of each hand small joints.   NEURO: Discomfort to palpation below the knees. Pain to palpation below the ankles. Significant weakness in left leg. " Left drop foot persists.   GAIT: Wheelchair. She is able to stand.     LABS:    IMAGING:      ASSESSMENT: 42 y.o. year old female with pain, consistent with:    Encounter Diagnoses   Name Primary?    MS (multiple sclerosis)     Neuropathic pain Yes    Physical deconditioning        DISCUSSION: Ms. June was diagnosed with MS in 2018 at [facility]. She reports spending approximately 1.5 years in bed and is now disabled and lives with her mom. She comes to us in a wheelchair, severely deconditioned, with joint pain in the hands and neuropathic pain below the knees.     PLAN:  Switch to Lyrica 100 TID  Referred to [facility]. Will likely need pre-hab.   Follow up in 4 weeks      Tesha Harding  05/08/2023

## 2023-05-09 ENCOUNTER — TELEPHONE (OUTPATIENT)
Dept: ADMINISTRATIVE | Facility: OTHER | Age: 43
End: 2023-05-09
Payer: MEDICARE

## 2023-05-09 NOTE — TELEPHONE ENCOUNTER
Left voice message for patient to return call to schedule appointment from referral to [facility] department.Portal message sent.  Judith BARROSO [phone]

## 2023-05-31 ENCOUNTER — TELEPHONE (OUTPATIENT)
Dept: PLASTIC SURGERY | Facility: CLINIC | Age: 43
End: 2023-05-31
Payer: MEDICARE

## 2023-05-31 NOTE — TELEPHONE ENCOUNTER
Spoke w pt and she would like to call back when ready to reschedule - losing weight at this time - she will call when ready.

## 2023-06-06 ENCOUNTER — PATIENT MESSAGE (OUTPATIENT)
Dept: RHEUMATOLOGY | Facility: CLINIC | Age: 43
End: 2023-06-06
Payer: MEDICARE

## 2023-06-09 ENCOUNTER — PATIENT MESSAGE (OUTPATIENT)
Dept: RHEUMATOLOGY | Facility: CLINIC | Age: 43
End: 2023-06-09
Payer: MEDICARE

## 2023-06-30 ENCOUNTER — HOSPITAL ENCOUNTER (EMERGENCY)
Facility: HOSPITAL | Age: 43
Discharge: HOME | End: 2023-06-30
Attending: EMERGENCY MEDICINE
Payer: MEDICARE

## 2023-06-30 ENCOUNTER — OFFICE VISIT (OUTPATIENT)
Dept: URGENT CARE | Facility: CLINIC | Age: 43
End: 2023-06-30
Payer: MEDICARE

## 2023-06-30 VITALS
RESPIRATION RATE: 16 BRPM | OXYGEN SATURATION: 95 % | HEART RATE: 84 BPM | TEMPERATURE: 98 F | SYSTOLIC BLOOD PRESSURE: 140 MMHG | DIASTOLIC BLOOD PRESSURE: 84 MMHG

## 2023-06-30 VITALS
RESPIRATION RATE: 16 BRPM | HEIGHT: 67 IN | DIASTOLIC BLOOD PRESSURE: 69 MMHG | HEART RATE: 92 BPM | TEMPERATURE: 99 F | BODY MASS INDEX: 15.7 KG/M2 | SYSTOLIC BLOOD PRESSURE: 107 MMHG | WEIGHT: 100 LBS | OXYGEN SATURATION: 99 %

## 2023-06-30 DIAGNOSIS — M25.561 ACUTE PAIN OF RIGHT KNEE: ICD-10-CM

## 2023-06-30 DIAGNOSIS — Z92.29 HX OF LONG TERM USE OF BLOOD THINNERS: ICD-10-CM

## 2023-06-30 DIAGNOSIS — H11.31 SUBCONJUNCTIVAL BLEED, RIGHT: ICD-10-CM

## 2023-06-30 DIAGNOSIS — T14.8XXA ABRASION: ICD-10-CM

## 2023-06-30 DIAGNOSIS — S09.90XA TRAUMATIC INJURY OF HEAD, INITIAL ENCOUNTER: Primary | ICD-10-CM
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
## 2023-06-30 DIAGNOSIS — S00.93XA CONTUSION OF HEAD, UNSPECIFIED PART OF HEAD, INITIAL ENCOUNTER: Primary | ICD-10-CM

## 2023-06-30 PROBLEM — I87.1 MAY-THURNER SYNDROME: Status: ACTIVE | Noted: 2023-06-30

## 2023-06-30 PROCEDURE — 99215 OFFICE O/P EST HI 40 MIN: CPT | Mod: S$GLB,,,

## 2023-06-30 PROCEDURE — 99284 EMERGENCY DEPT VISIT MOD MDM: CPT | Mod: 25

## 2023-06-30 PROCEDURE — 99215 PR OFFICE/OUTPT VISIT, EST, LEVL V, 40-54 MIN: ICD-10-PCS | Mod: S$GLB,,,

## 2023-06-30 PROCEDURE — 25000003 PHARM REV CODE 250: Performed by: EMERGENCY MEDICINE

## 2023-06-30 RX ORDER — ONABOTULINUMTOXINA 200 [USP'U]/1
INJECTION, POWDER, LYOPHILIZED, FOR SOLUTION INTRADERMAL; INTRAMUSCULAR
COMMUNITY
Start: 2023-05-22

## 2023-06-30 RX ORDER — HYDROCODONE BITARTRATE AND ACETAMINOPHEN 5; 325 MG/1; MG/1
1 TABLET ORAL EVERY 4 HOURS PRN
Qty: 12 TABLET | Refills: 0 | Status: SHIPPED | OUTPATIENT
Start: 2023-06-30 | End: 2023-07-02

## 2023-06-30 RX ORDER — RIMEGEPANT SULFATE 75 MG/75MG
75 TABLET, ORALLY DISINTEGRATING ORAL DAILY PRN
COMMUNITY
Start: 2023-05-01

## 2023-06-30 RX ORDER — BUSPIRONE HYDROCHLORIDE 10 MG/1
10 TABLET ORAL 3 TIMES DAILY
COMMUNITY
Start: 2023-03-21 | End: 2023-12-05

## 2023-06-30 RX ORDER — BACITRACIN ZINC 500 UNIT/G
OINTMENT (GRAM) TOPICAL 2 TIMES DAILY
Qty: 30 G | Refills: 0 | Status: SHIPPED | OUTPATIENT
Start: 2023-06-30 | End: 2023-12-05

## 2023-06-30 RX ORDER — OXYCODONE AND ACETAMINOPHEN 5; 325 MG/1; MG/1
1 TABLET ORAL
Status: COMPLETED | OUTPATIENT
Start: 2023-06-30 | End: 2023-06-30

## 2023-06-30 RX ADMIN — OXYCODONE HYDROCHLORIDE AND ACETAMINOPHEN 1 TABLET: 5; 325 TABLET ORAL at 06:06

## 2023-06-30 NOTE — ED PROVIDER NOTES
Encounter Date: 6/30/2023       History     Chief Complaint   Patient presents with    Fall     Hx MS, witnessed fall at home, hit face on concrete. Denies LOC. +blood thinners.     43 yo W with extensive pmhx including MS, DVT 2/2 May-Thurner syndrome on apixaban, anxiety, depression, Crohn's presents with a chief complaint of fall.  Patient had a fall the evening before last, almost 48 hours ago.  Patient was outside Wednesday evening at 11:00 p.m. when she called for mom to help her but it took her mom 30 seconds to get there and she went to grab her phone and she fell.  She landed on her right face on the cement.  No LOC.  She sustained abrasions to her right face and right knee.  She reported some difficulty blinking yesterday due to swelling but the swelling has improved.  She saw her mother's boyfriend today who noted how bad she looked and recommended she go to urgent care.  At urgent care she was referred to the ED for CT scans.  No vomiting or confusion.  Last received Tdap in 2020.    The history is provided by the patient and a relative.   Review of patient's allergies indicates:   Allergen Reactions    Codeine Other (See Comments)     Heartburn    Sulfa (sulfonamide antibiotics) Itching    Vicodin [hydrocodone-acetaminophen] Hives and Itching     Can take percocet    Azithromycin Other (See Comments)     Skin melting    Chlorhexidine gluconate Itching    Macrolide antibiotics Anxiety and Hallucinations     Past Medical History:   Diagnosis Date    Anticoagulant long-term use     Anxiety     Asthma     Crohn disease     Depression     DVT (deep venous thrombosis)     Encounter for blood transfusion     Insomnia     May-Thurner syndrome     abdominal stents    Multiple sclerosis     Neuropathy      Past Surgical History:   Procedure Laterality Date    HYSTERECTOMY      REMOVAL OF TRACHEOSTOMY TUBE      THYROIDECTOMY      TONSILLECTOMY      TRACHEOTOMY      only for 2 weeks    TURBINOPLASTY       No family  history on file.  Social History     Tobacco Use    Smoking status: Former     Types: Cigarettes     Quit date: 2019     Years since quittin.3    Smokeless tobacco: Former   Substance Use Topics    Alcohol use: Yes     Comment: occasional    Drug use: Never     Review of Systems    Physical Exam     Initial Vitals [23 1710]   BP Pulse Resp Temp SpO2   108/63 108 16 98.3 °F (36.8 °C) (!) 94 %      MAP       --         Physical Exam    Nursing note and vitals reviewed.  Constitutional: She appears well-developed and well-nourished. She is not diaphoretic. No distress.   HENT:   3 cm abrasion to forehead  Right periorbital ecchymosis and swelling   Eyes: Right eye exhibits no discharge. Left eye exhibits no discharge. No scleral icterus.   Right eye with extensive subconjunctival hemorrhage throughout the temporal aspect  Pupils reactive but right pupil more dilated than left   Neck: Neck supple. No JVD present.   Midline tenderness and more pronounced right paracervical tenderness/spasm   Normal range of motion.  Cardiovascular:  Normal rate, regular rhythm and normal heart sounds.     Exam reveals no gallop and no friction rub.       No murmur heard.  Not tachycardic despite as noted at triage   Pulmonary/Chest: Breath sounds normal. No respiratory distress. She has no wheezes. She has no rhonchi. She has no rales.   Abdominal: Abdomen is soft. She exhibits no distension and no mass. There is no abdominal tenderness. There is no rebound and no guarding.   Musculoskeletal:         General: Normal range of motion.      Cervical back: Normal range of motion and neck supple.      Right knee: Swelling present. No bony tenderness or crepitus. Normal range of motion.      Comments: 3 cm abrasion overlying R patella     Neurological: She is alert and oriented to person, place, and time. She has normal strength. No sensory deficit.   Skin: Skin is warm and dry. Capillary refill takes less than 2 seconds.    Psychiatric: Thought content normal.       ED Course   Procedures  Labs Reviewed   HIV 1 / 2 ANTIBODY   HEPATITIS C ANTIBODY          Imaging Results              CT Head Without Contrast (Final result)  Result time 06/30/23 19:07:16      Final result by Wyatt Woodall MD (06/30/23 19:07:16)                   Impression:      1. Right paramedian frontal scalp localized soft tissue swelling/contusion without displaced skull fracture or acute intracranial abnormality identified.  2. Right periorbital and facial soft tissue swelling/contusion without associated right-sided maxillofacial acute displaced fracture-dislocation identified.  3. Mildly depressed fracture of the mid left nasal bone without significant overlying soft tissue swelling, may be remote.  Correlate for point tenderness at this region.  4. No CT evidence of cervical spine acute osseous traumatic injury.  5. Few additional findings as above.      Electronically signed by: Wyatt Woodall MD  Date:    06/30/2023  Time:    19:07               Narrative:    EXAMINATION:  CT HEAD WITHOUT CONTRAST; CT MAXILLOFACIAL WITHOUT CONTRAST; CT CERVICAL SPINE WITHOUT CONTRAST    CLINICAL HISTORY:  Facial trauma, blunt;; Neck trauma, midline tenderness (Age 16-64y);    TECHNIQUE:  Low dose axial CT images obtained throughout the head, face and cervical spine without intravenous contrast. Sagittal and coronal reconstructions were performed.    COMPARISON:  No prior imaging available for direct comparison.    FINDINGS:  Head CT:    Intracranial compartment:    Ventricles and sulci are normal in size for age without evidence of hydrocephalus. No extra-axial blood or fluid collections.    Noncontrast appearance of the brain parenchyma is within normal limits.  No new focal areas of abnormal parenchymal attenuation.  No parenchymal mass, hemorrhage, edema or major vascular distribution infarct.    Skull/extracranial contents (limited evaluation): Localized soft tissue  swelling/contusion with subcutaneous hyperattenuation overlying the paramedian right frontal calvarium.    Maxillofacial CT: There is right-sided periorbital soft tissue swelling extending to the right nasal orbital and infraorbital/malar region and slightly laterally overlying the right zygomatic arch.  Minimal depression deformity at the midportion of the left nasal bone without significant overlying soft tissue swelling which may be remote, noting no prior imaging available for direct comparison.    Orbits are otherwise symmetric and within normal limits.  Both ocular lenses are anteriorly located.  No retro bulbar hematoma or intraorbital gas.  No subcutaneous emphysema or radiodense retained foreign body.  Bilateral orbital rims, zygomatic arches, pterygoid plates, mandibular condyles, and TMJs appear relatively symmetric and intact.  Bony nasal septum is relatively midline and intact.  Paranasal sinuses, middle ears, external auditory canals and mastoid air cells are clear.    Included airway is midline and patent.  Bilateral parotid and submandibular glands are within normal limits.    Cervical spine CT: Bones are well mineralized.  Straightening of the cervical lordosis.  Vertebral body and intervertebral disc space heights appear relatively maintained.  No occipital condylar fracture.  Minimal degenerative change at the atlantodental interval.  Dens and lateral masses are otherwise well aligned and intact.  No displaced fracture, dislocation or significant listhesis.  No significant degenerative change, spinal canal stenosis or neural foraminal narrowing at any level.  No significant prevertebral soft tissue thickening.  No paraspinal mass or fluid collection.  No subcutaneous emphysema or radiodense retained foreign body.    Included airway remains patent.  The visualized upper esophagus is patulous without wall thickening or adjacent inflammation.  Biapical pleuroparenchymal scarring with patchy  nonspecific pulmonary mosaic attenuation of the imaged bilateral upper lung zones which can be seen with pulmonary edema versus infectious or inflammatory process.  No apical pneumothorax.                                       CT Maxillofacial Without Contrast (Final result)  Result time 06/30/23 19:07:16      Final result by Wyatt Woodall MD (06/30/23 19:07:16)                   Impression:      1. Right paramedian frontal scalp localized soft tissue swelling/contusion without displaced skull fracture or acute intracranial abnormality identified.  2. Right periorbital and facial soft tissue swelling/contusion without associated right-sided maxillofacial acute displaced fracture-dislocation identified.  3. Mildly depressed fracture of the mid left nasal bone without significant overlying soft tissue swelling, may be remote.  Correlate for point tenderness at this region.  4. No CT evidence of cervical spine acute osseous traumatic injury.  5. Few additional findings as above.      Electronically signed by: Wyatt Woodall MD  Date:    06/30/2023  Time:    19:07               Narrative:    EXAMINATION:  CT HEAD WITHOUT CONTRAST; CT MAXILLOFACIAL WITHOUT CONTRAST; CT CERVICAL SPINE WITHOUT CONTRAST    CLINICAL HISTORY:  Facial trauma, blunt;; Neck trauma, midline tenderness (Age 16-64y);    TECHNIQUE:  Low dose axial CT images obtained throughout the head, face and cervical spine without intravenous contrast. Sagittal and coronal reconstructions were performed.    COMPARISON:  No prior imaging available for direct comparison.    FINDINGS:  Head CT:    Intracranial compartment:    Ventricles and sulci are normal in size for age without evidence of hydrocephalus. No extra-axial blood or fluid collections.    Noncontrast appearance of the brain parenchyma is within normal limits.  No new focal areas of abnormal parenchymal attenuation.  No parenchymal mass, hemorrhage, edema or major vascular distribution  infarct.    Skull/extracranial contents (limited evaluation): Localized soft tissue swelling/contusion with subcutaneous hyperattenuation overlying the paramedian right frontal calvarium.    Maxillofacial CT: There is right-sided periorbital soft tissue swelling extending to the right nasal orbital and infraorbital/malar region and slightly laterally overlying the right zygomatic arch.  Minimal depression deformity at the midportion of the left nasal bone without significant overlying soft tissue swelling which may be remote, noting no prior imaging available for direct comparison.    Orbits are otherwise symmetric and within normal limits.  Both ocular lenses are anteriorly located.  No retro bulbar hematoma or intraorbital gas.  No subcutaneous emphysema or radiodense retained foreign body.  Bilateral orbital rims, zygomatic arches, pterygoid plates, mandibular condyles, and TMJs appear relatively symmetric and intact.  Bony nasal septum is relatively midline and intact.  Paranasal sinuses, middle ears, external auditory canals and mastoid air cells are clear.    Included airway is midline and patent.  Bilateral parotid and submandibular glands are within normal limits.    Cervical spine CT: Bones are well mineralized.  Straightening of the cervical lordosis.  Vertebral body and intervertebral disc space heights appear relatively maintained.  No occipital condylar fracture.  Minimal degenerative change at the atlantodental interval.  Dens and lateral masses are otherwise well aligned and intact.  No displaced fracture, dislocation or significant listhesis.  No significant degenerative change, spinal canal stenosis or neural foraminal narrowing at any level.  No significant prevertebral soft tissue thickening.  No paraspinal mass or fluid collection.  No subcutaneous emphysema or radiodense retained foreign body.    Included airway remains patent.  The visualized upper esophagus is patulous without wall thickening  or adjacent inflammation.  Biapical pleuroparenchymal scarring with patchy nonspecific pulmonary mosaic attenuation of the imaged bilateral upper lung zones which can be seen with pulmonary edema versus infectious or inflammatory process.  No apical pneumothorax.                                       CT Cervical Spine Without Contrast (Final result)  Result time 06/30/23 19:07:16      Final result by Wyatt Woodall MD (06/30/23 19:07:16)                   Impression:      1. Right paramedian frontal scalp localized soft tissue swelling/contusion without displaced skull fracture or acute intracranial abnormality identified.  2. Right periorbital and facial soft tissue swelling/contusion without associated right-sided maxillofacial acute displaced fracture-dislocation identified.  3. Mildly depressed fracture of the mid left nasal bone without significant overlying soft tissue swelling, may be remote.  Correlate for point tenderness at this region.  4. No CT evidence of cervical spine acute osseous traumatic injury.  5. Few additional findings as above.      Electronically signed by: Wyatt Woodall MD  Date:    06/30/2023  Time:    19:07               Narrative:    EXAMINATION:  CT HEAD WITHOUT CONTRAST; CT MAXILLOFACIAL WITHOUT CONTRAST; CT CERVICAL SPINE WITHOUT CONTRAST    CLINICAL HISTORY:  Facial trauma, blunt;; Neck trauma, midline tenderness (Age 16-64y);    TECHNIQUE:  Low dose axial CT images obtained throughout the head, face and cervical spine without intravenous contrast. Sagittal and coronal reconstructions were performed.    COMPARISON:  No prior imaging available for direct comparison.    FINDINGS:  Head CT:    Intracranial compartment:    Ventricles and sulci are normal in size for age without evidence of hydrocephalus. No extra-axial blood or fluid collections.    Noncontrast appearance of the brain parenchyma is within normal limits.  No new focal areas of abnormal parenchymal attenuation.  No  parenchymal mass, hemorrhage, edema or major vascular distribution infarct.    Skull/extracranial contents (limited evaluation): Localized soft tissue swelling/contusion with subcutaneous hyperattenuation overlying the paramedian right frontal calvarium.    Maxillofacial CT: There is right-sided periorbital soft tissue swelling extending to the right nasal orbital and infraorbital/malar region and slightly laterally overlying the right zygomatic arch.  Minimal depression deformity at the midportion of the left nasal bone without significant overlying soft tissue swelling which may be remote, noting no prior imaging available for direct comparison.    Orbits are otherwise symmetric and within normal limits.  Both ocular lenses are anteriorly located.  No retro bulbar hematoma or intraorbital gas.  No subcutaneous emphysema or radiodense retained foreign body.  Bilateral orbital rims, zygomatic arches, pterygoid plates, mandibular condyles, and TMJs appear relatively symmetric and intact.  Bony nasal septum is relatively midline and intact.  Paranasal sinuses, middle ears, external auditory canals and mastoid air cells are clear.    Included airway is midline and patent.  Bilateral parotid and submandibular glands are within normal limits.    Cervical spine CT: Bones are well mineralized.  Straightening of the cervical lordosis.  Vertebral body and intervertebral disc space heights appear relatively maintained.  No occipital condylar fracture.  Minimal degenerative change at the atlantodental interval.  Dens and lateral masses are otherwise well aligned and intact.  No displaced fracture, dislocation or significant listhesis.  No significant degenerative change, spinal canal stenosis or neural foraminal narrowing at any level.  No significant prevertebral soft tissue thickening.  No paraspinal mass or fluid collection.  No subcutaneous emphysema or radiodense retained foreign body.    Included airway remains patent.   The visualized upper esophagus is patulous without wall thickening or adjacent inflammation.  Biapical pleuroparenchymal scarring with patchy nonspecific pulmonary mosaic attenuation of the imaged bilateral upper lung zones which can be seen with pulmonary edema versus infectious or inflammatory process.  No apical pneumothorax.                                       Medications   oxyCODONE-acetaminophen 5-325 mg per tablet 1 tablet (1 tablet Oral Given 6/30/23 1801)     Medical Decision Making:   History:   Old Medical Records: I decided to obtain old medical records.  Initial Assessment:   41 yo W with extensive pmhx including MS, DVT 2/2 May-Thurner syndrome on apixaban, anxiety, depression, Crohn's presents with a chief complaint of fall.    Differential Diagnosis:   ICH, concussion, facial fracture, orbital wall fracture, neck fracture, neck spasm, abrasion, knee fracture  Clinical Tests:   Radiological Study: Ordered  ED Management:  Will administer analgesics.  Will obtain CT head, max face, cervical spine and plain films of right knee.  Tetanus is up-to-date.                 Reassessment:  CTs reviewed.  Patient with no point tenderness of the mid left nasal bone.  She notes she has had a previous septal deviation surgery.  She declined plain films of knee.  On repeat assessment, patient remains resting comfortably.  I reviewed imaging with her.  She reports severe pain and a 1 time prescription for opiates were provided.  Provided with our chronic pain policy.  Script also sent for bacitracin.  Return precautions.       Clinical Impression:   Final diagnoses:  [W19.XXXA] Fall  [S00.93XA] Contusion of head, unspecified part of head, initial encounter (Primary)  [T14.8XXA] Abrasion  [H11.31] Subconjunctival bleed, right        ED Disposition Condition    Discharge Stable          ED Prescriptions       Medication Sig Dispense Start Date End Date Auth. Provider    bacitracin 500 unit/gram Oint Apply topically 2  (two) times daily. 30 g 6/30/2023 -- Anthony Toney MD    HYDROcodone-acetaminophen (NORCO) 5-325 mg per tablet Take 1 tablet by mouth every 4 (four) hours as needed for Pain. 12 tablet 6/30/2023 7/2/2023 Anthony Toney MD          Follow-up Information       Follow up With Specialties Details Why Contact Info    [facility] Emergency Medicine  As needed, If symptoms worsen [address]  [facility] [id number] [phone]             Anthony Toney MD  06/30/23 6901

## 2023-06-30 NOTE — ED TRIAGE NOTES
Pt arrives to ED post fall. Pt states she fell 2 nights ago. Pt states she decided to go to urgent care today and was told she needed a CT scan. Pt states a hx of MS. Pt has a scrapped knee and forehead and right cheek. Pt endorses a headache. Pt denies change in vision today. Pt denies CP/SOB.

## 2023-06-30 NOTE — PROGRESS NOTES
"Subjective:      Patient ID: Oliva June is a 42 y.o. female.    Vitals:  height is 5' 7" (1.702 m) and weight is 45.4 kg (100 lb). Her temperature is 98.9 °F (37.2 °C). Her blood pressure is 107/69 and her pulse is 92. Her respiration is 16 and oxygen saturation is 99%.     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 Patient got up and was turning around when she lost her balance and her face hit the concrete ground. Patient landed on the right knee and right side of her face. She complains of pain around the right orbit. She denies loss of consciousness. Denies blurred vision. Denies headaches.     Fall  The accident occurred 2 days ago. Fall occurred: a chair. She fell from a height of 3 to 5 ft. She landed on [place]. The volume of blood lost was minimal. The point of impact was the head, face, left knee and right knee. The pain is present in the head and face. The symptoms are aggravated by use of injured limb and pressure on injury. Associated symptoms include headaches, numbness and tingling. Pertinent negatives include no fever, loss of consciousness, nausea or vomiting. She has tried NSAID for the symptoms. The treatment provided no relief.     Constitution: Negative for fever.   Gastrointestinal:  Negative for nausea and vomiting.   Neurological:  Positive for coordination disturbances, headaches and numbness. Negative for loss of consciousness.    Objective:     Physical Exam   Constitutional: She is oriented to person, place, and time. She appears well-developed. She is cooperative.  Non-toxic appearance. She does not appear ill. No distress.      Comments:Answers questions in complete sentences. Does not show any signs of distress or discoloration.        HENT:   Head: Normocephalic and atraumatic. Head is without abrasion, without contusion and without laceration.   Ears:   Right Ear: " Hearing, tympanic membrane, external ear and ear canal normal. No hemotympanum.   Left Ear: Hearing, tympanic membrane, external ear and ear canal normal. No hemotympanum.   Nose: Nose normal. No mucosal edema, rhinorrhea or nasal deformity. No epistaxis. Right sinus exhibits no maxillary sinus tenderness and no frontal sinus tenderness. Left sinus exhibits no maxillary sinus tenderness and no frontal sinus tenderness.   Mouth/Throat: Uvula is midline, oropharynx is clear and moist and mucous membranes are normal. No trismus in the jaw. Normal dentition. No uvula swelling. No posterior oropharyngeal erythema.   Eyes: Conjunctivae, EOM and lids are normal. Pupils are equal, round, and reactive to light. Right eye exhibits no discharge. Left eye exhibits no discharge. No scleral icterus.          Comments: Right pupil more dilated than left eye. Both reactive to light. EOMI. Right subconjunctival hemorrhage to the right outer eye. There is orbital swelling, bruising and tenderness to palpation on the right side. Abrasions present around the right orbit.    Neck: Trachea normal and phonation normal. Neck supple. No tracheal deviation present. No neck rigidity present. No spinous process tenderness present. No muscular tenderness present.   Cardiovascular: Normal rate, regular rhythm, normal heart sounds and normal pulses.   Pulmonary/Chest: Effort normal and breath sounds normal. No respiratory distress.   Abdominal: Normal appearance and bowel sounds are normal. She exhibits no distension and no mass. Soft. There is no abdominal tenderness.   Musculoskeletal: Normal range of motion.         General: No deformity. Normal range of motion.      Comments: Abrasions present tot he right knee. There is swelling present.    Neurological: She is alert and oriented to person, place, and time. She has normal strength. She displays weakness. She displays no dysarthria. No cranial nerve deficit or sensory deficit. She exhibits  normal muscle tone. She displays no seizure activity. Coordination and gait abnormal. GCS eye subscore is 4. GCS verbal subscore is 5. GCS motor subscore is 6.      Comments: Patient uses wheelchair at baseline.    Skin: Skin is warm, dry, intact, not diaphoretic and not pale. Capillary refill takes less than 2 seconds. No abrasion, No burn, No bruising and No ecchymosis   Psychiatric: Her speech is normal and behavior is normal. Judgment and thought content normal.   Nursing note and vitals reviewed.    Assessment:     1. Traumatic injury of head, initial encounter    2. Hx of long term use of blood thinners    3. 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 Patient got up and was turning around when she lost her balance and her face hit the concrete ground. Patient landed on the right knee and right side of her face. She complains of pain around the right orbit. She denies loss of consciousness. Denies blurred vision. Denies headaches. Vitals stable. Unable to get proper neuro exam due to baseline deficits from MS. Right pupil more dilatated than the right. Both reactive to light. EOMI. Right subconjunctival hemorrhage present to the outer corner. Bruising, swelling and tenderness around the right orbit. Tenderness to the right side of the nose. No hemotympanum. Based on hx and exam patient at risk for but not limited to intracranial hemorrhage, orbital fracture, nasal fracture, concussion, skull fracture. Patient needs higher level of care in the ED. Notified [facility] of patients arrival. Patient refused EMS, will have her mother bring her. Spoke with Dr. Payne who agrees with plan.     Traumatic injury of head, initial encounter  -     Refer to Emergency Dept.    Hx of long term use of blood thinners  -     Refer to Emergency Dept.    Acute pain of right knee

## 2023-07-01 NOTE — DISCHARGE INSTRUCTIONS
Use bacitracin twice a day to prevent the scabs from getting infected.  You are receiving a 1 time courtesy prescription of hydrocodone-acetaminophen to assist with your pain.  This is an opiate so do not take it before driving or operating heavy machinery.  Return to the ER for any worsening headache, weakness, numbness, change in vision, or other concerning symptoms.

## 2023-07-03 ENCOUNTER — NURSE TRIAGE (OUTPATIENT)
Dept: ADMINISTRATIVE | Facility: CLINIC | Age: 43
End: 2023-07-03
Payer: MEDICARE

## 2023-07-03 PROBLEM — F32.9 REACTIVE DEPRESSION: Status: ACTIVE | Noted: 2020-03-11

## 2023-07-03 PROBLEM — Z86.718 HISTORY OF DEEP VEIN THROMBOSIS (DVT) OF LOWER EXTREMITY: Status: ACTIVE | Noted: 2020-06-04

## 2023-07-03 PROBLEM — K21.9 GERD WITHOUT ESOPHAGITIS: Status: ACTIVE | Noted: 2020-03-11

## 2023-07-03 PROBLEM — E03.9 ACQUIRED HYPOTHYROIDISM: Status: ACTIVE | Noted: 2019-10-29

## 2023-07-03 PROBLEM — N80.9 ENDOMETRIOSIS DETERMINED BY LAPAROSCOPY: Status: ACTIVE | Noted: 2019-09-03

## 2023-07-03 PROBLEM — R11.0 NAUSEA: Status: ACTIVE | Noted: 2021-11-29

## 2023-07-03 PROBLEM — E43 SEVERE PROTEIN-CALORIE MALNUTRITION: Status: ACTIVE | Noted: 2021-02-01

## 2023-07-03 PROBLEM — D50.9 IRON DEFICIENCY ANEMIA: Status: ACTIVE | Noted: 2021-12-13

## 2023-07-03 PROBLEM — J32.0 CHRONIC MAXILLARY SINUSITIS: Status: ACTIVE | Noted: 2020-03-11

## 2023-07-03 PROBLEM — K50.119 CROHN'S DISEASE OF COLON WITH COMPLICATION: Status: ACTIVE | Noted: 2021-01-29

## 2023-07-03 PROBLEM — E51.9 THIAMINE DEFICIENCY: Status: ACTIVE | Noted: 2021-02-04

## 2023-07-03 PROBLEM — E60 ZINC DEFICIENCY: Status: ACTIVE | Noted: 2021-02-04

## 2023-07-03 PROBLEM — R25.2 SPASM: Status: ACTIVE | Noted: 2023-07-03

## 2023-07-03 PROBLEM — I82.409 DVT (DEEP VENOUS THROMBOSIS): Status: ACTIVE | Noted: 2023-07-03

## 2023-07-04 NOTE — TELEPHONE ENCOUNTER
[place]    PCP:  [facility]    H/O MS.  She reports fell and hit her face on the concrete.  She reports that they gave her Norco instead of Percocet.  She reports that there is a nationwide shortage.  Also, she is allergic to acetaminophen and so she needs something else called in without acetaminophen.  Med was ordered in the ED at [facility].  Per protocol, care advised is call Provider now.  [place] [facility]  Provider will contact pt directly for recommendations.  Pt contacted and notified that Provider will reach back out to her directly for recommendations.  Pt VU.  Advised to call for worsening/questions/concerns.  VU.     Reason for Disposition   [1] Caller has URGENT medicine question about med that PCP or specialist prescribed AND [2] triager unable to answer question    Additional Information   Negative: [1] Intentional drug overdose AND [2] suicidal thoughts or ideas   Negative: MORE THAN A DOUBLE DOSE of a prescription or over-the-counter (OTC) drug   Negative: [1] DOUBLE DOSE (an extra dose or lesser amount) of prescription drug AND [2] any symptoms (e.g., dizziness, nausea, pain, sleepiness)   Negative: [1] DOUBLE DOSE (an extra dose or lesser amount) of over-the-counter (OTC) drug AND [2] any symptoms (e.g., dizziness, nausea, pain, sleepiness)   Negative: Took another person's prescription drug   Negative: [1] DOUBLE DOSE (an extra dose or lesser amount) of prescription drug AND [2] NO symptoms  (Exception: A double dose of antibiotics.)   Negative: Diabetes drug error or overdose (e.g., took wrong type of insulin or took extra dose)   Negative: [1] Prescription not at pharmacy AND [2] was prescribed by PCP recently (Exception: Triager has access to EMR and prescription is recorded there. Go to Home Care and confirm for pharmacy.)   Negative: [1] Pharmacy calling with prescription question AND [2] triager unable to answer question    Protocols used: Medication  Question Call-A-AH

## 2023-07-05 ENCOUNTER — TELEPHONE (OUTPATIENT)
Dept: EMERGENCY MEDICINE | Facility: HOSPITAL | Age: 43
End: 2023-07-05
Payer: MEDICARE

## 2023-07-05 RX ORDER — OXYCODONE HYDROCHLORIDE 5 MG/1
5 TABLET ORAL EVERY 4 HOURS PRN
Qty: 12 TABLET | Refills: 0 | Status: SHIPPED | OUTPATIENT
Start: 2023-07-05 | End: 2023-07-08

## 2023-07-05 NOTE — TELEPHONE ENCOUNTER
Pt unable to fill previous prescription at pharmacy 2/2 supply chain issues.  Updated prescription provided.

## 2023-07-21 ENCOUNTER — PATIENT MESSAGE (OUTPATIENT)
Dept: PSYCHIATRY | Facility: CLINIC | Age: 43
End: 2023-07-21
Payer: MEDICARE

## 2023-08-16 ENCOUNTER — TELEPHONE (OUTPATIENT)
Dept: PODIATRY | Facility: CLINIC | Age: 43
End: 2023-08-16
Payer: MEDICARE

## 2023-08-16 NOTE — TELEPHONE ENCOUNTER
Staff contacted and scheduled patient for August 17, 2023 at 11:15 am., with Dr. Wood.      Patient verbalized understanding.

## 2023-08-16 NOTE — TELEPHONE ENCOUNTER
----- Message from Eloisa Abilio sent at 8/16/2023  4:32 PM CDT -----  Regarding: appointment  Name of Who is Calling: Oliva           What is the request in detail: Patient is requesting a call back to be seen tomorrow. She injured both of her feet and toes because she feel out of the wheelchair on Red Dress day.           Can the clinic reply by [facility]: Yes           [facility]: [phone]

## 2023-08-17 ENCOUNTER — OFFICE VISIT (OUTPATIENT)
Dept: PODIATRY | Facility: CLINIC | Age: 43
End: 2023-08-17
Payer: MEDICARE

## 2023-08-17 ENCOUNTER — APPOINTMENT (OUTPATIENT)
Dept: RADIOLOGY | Facility: OTHER | Age: 43
End: 2023-08-17
Payer: MEDICARE

## 2023-08-17 ENCOUNTER — NURSE TRIAGE (OUTPATIENT)
Dept: ADMINISTRATIVE | Facility: CLINIC | Age: 43
End: 2023-08-17
Payer: MEDICARE

## 2023-08-17 VITALS — BODY MASS INDEX: 15.7 KG/M2 | HEIGHT: 67 IN | WEIGHT: 100 LBS

## 2023-08-17 DIAGNOSIS — M79.89 SWELLING OF LEFT FOOT: ICD-10-CM

## 2023-08-17 DIAGNOSIS — S92.902B FOOT FRACTURE, LEFT, OPEN, INITIAL ENCOUNTER: ICD-10-CM

## 2023-08-17 DIAGNOSIS — S91.209A TRAUMATIC AVULSION OF NAIL PLATE OF TOE, INITIAL ENCOUNTER: ICD-10-CM

## 2023-08-17 DIAGNOSIS — S92.902B FOOT FRACTURE, LEFT, OPEN, INITIAL ENCOUNTER: Primary | ICD-10-CM

## 2023-08-17 PROCEDURE — 28470 CLTX METATARSAL FX WO MNP EA: CPT | Mod: LT,S$GLB,, | Performed by: PODIATRIST

## 2023-08-17 PROCEDURE — 73630 XR FOOT COMPLETE 3 VIEW LEFT: ICD-10-PCS | Mod: 26,LT,, | Performed by: RADIOLOGY

## 2023-08-17 PROCEDURE — 3008F PR BODY MASS INDEX (BMI) DOCUMENTED: ICD-10-PCS | Mod: CPTII,S$GLB,, | Performed by: PODIATRIST

## 2023-08-17 PROCEDURE — 3008F BODY MASS INDEX DOCD: CPT | Mod: CPTII,S$GLB,, | Performed by: PODIATRIST

## 2023-08-17 PROCEDURE — 99999 PR PBB SHADOW E&M-EST. PATIENT-LVL IV: ICD-10-PCS | Mod: PBBFAC,,, | Performed by: PODIATRIST

## 2023-08-17 PROCEDURE — 99214 PR OFFICE/OUTPT VISIT, EST, LEVL IV, 30-39 MIN: ICD-10-PCS | Mod: 57,S$GLB,, | Performed by: PODIATRIST

## 2023-08-17 PROCEDURE — 99214 OFFICE O/P EST MOD 30 MIN: CPT | Mod: 57,S$GLB,, | Performed by: PODIATRIST

## 2023-08-17 PROCEDURE — 73630 X-RAY EXAM OF FOOT: CPT | Mod: 26,LT,, | Performed by: RADIOLOGY

## 2023-08-17 PROCEDURE — 28470 PR CLOSED RX METATARSAL FX: ICD-10-PCS | Mod: LT,S$GLB,, | Performed by: PODIATRIST

## 2023-08-17 PROCEDURE — 1159F MED LIST DOCD IN RCRD: CPT | Mod: CPTII,S$GLB,, | Performed by: PODIATRIST

## 2023-08-17 PROCEDURE — 99999 PR PBB SHADOW E&M-EST. PATIENT-LVL IV: CPT | Mod: PBBFAC,,, | Performed by: PODIATRIST

## 2023-08-17 PROCEDURE — 1159F PR MEDICATION LIST DOCUMENTED IN MEDICAL RECORD: ICD-10-PCS | Mod: CPTII,S$GLB,, | Performed by: PODIATRIST

## 2023-08-17 PROCEDURE — 73630 X-RAY EXAM OF FOOT: CPT | Mod: TC,LT

## 2023-08-17 RX ORDER — LIDOCAINE AND PRILOCAINE 25; 25 MG/G; MG/G
CREAM TOPICAL
Qty: 30 G | Refills: 3 | Status: CANCELLED | OUTPATIENT
Start: 2023-08-17

## 2023-08-17 RX ORDER — ESOMEPRAZOLE MAGNESIUM 40 MG/1
40 CAPSULE, DELAYED RELEASE ORAL
COMMUNITY
Start: 2023-07-24

## 2023-08-17 RX ORDER — ONDANSETRON 4 MG/1
4 TABLET, ORALLY DISINTEGRATING ORAL 2 TIMES DAILY PRN
COMMUNITY
Start: 2023-08-04

## 2023-08-17 NOTE — TELEPHONE ENCOUNTER
Pt calling to discuss Xray results. Also states that boot for left foot is too large. Advised per protocol. Verbalized understanding. Encounter routed to provider.       Reason for Disposition   Caller requesting lab results  (Exception: Routine or non-urgent lab result.)    Protocols used: PCP Call - No Triage-A-

## 2023-08-17 NOTE — PROGRESS NOTES
Subjective:      Patient ID: Oliva June is a 42 y.o. female.    Chief Complaint: Foot Injury (L foot)    Diffuse pain swelling and bruising left forefoot.  Unknown exact onset, impact it in during transfer about a week ago.  No prior examination.  No self-treatment.  Denies surgery.  Fall about a month ago 2 months ago for which she was evaluated in the emergency room-did not appear to involve left foot.    Cc2 right 3rd toenail dramatically avulsed catching again on rug during transfer.  Aggravated with pressure.  No change since occurrence.  No prior medical treatment.  No self-treatment.  Denies repeat trauma and surgery right 3rd toe.        Review of Systems   Constitutional: Negative for chills, diaphoresis, fever, malaise/fatigue and night sweats.   Cardiovascular:  Negative for claudication, cyanosis, leg swelling and syncope.   Skin:  Positive for nail changes. Negative for color change, dry skin, rash, suspicious lesions and unusual hair distribution.   Musculoskeletal:  Positive for joint pain and joint swelling. Negative for falls, muscle cramps, muscle weakness and stiffness.   Gastrointestinal:  Negative for constipation, diarrhea, nausea and vomiting.   Neurological:  Negative for brief paralysis, disturbances in coordination, focal weakness, numbness, paresthesias, sensory change and tremors.         Objective:      Physical Exam  Constitutional:       General: She is not in acute distress.     Appearance: She is well-developed. She is not diaphoretic.   Cardiovascular:      Pulses:           Popliteal pulses are 2+ on the right side and 2+ on the left side.        Dorsalis pedis pulses are 2+ on the right side and 2+ on the left side.        Posterior tibial pulses are 2+ on the right side and 2+ on the left side.      Comments: Capillary refill 3 seconds all toes/distal feet, all toes/both feet warm to touch.      Negative lymphadenopathy bilateral popliteal fossa and tarsal tunnel.       Negavie lower extremity edema bilateral.    Musculoskeletal:      Right ankle: No swelling, deformity, ecchymosis or lacerations. Normal range of motion. Normal pulse.      Right Achilles Tendon: Normal. No defects. Ayers's test negative.      Comments: Mild tenderness to palpation with diffuse edema and bruising left forefoot midfoot without focal area of most tenderness, without gross deformity, without loss of function.      Patient has baseline weakness bilateral lower extremities-wheelchair-bound most of the time-stance for transfers.   Lymphadenopathy:      Lower Body: No right inguinal adenopathy. No left inguinal adenopathy.      Comments: Negative lymphadenopathy bilateral popliteal fossa and tarsal tunnel.    Negative lymphangitic streaking bilateral feet/ankles/legs.   Skin:     General: Skin is warm and dry.      Capillary Refill: Capillary refill takes 2 to 3 seconds.      Coloration: Skin is not pale.      Findings: No abrasion, bruising, burn, ecchymosis, erythema, laceration, lesion or rash.      Nails: There is no clubbing.      Comments:   Diffuse bruising left forefoot between the metatarsophalangeal joints, toe bases, mid metatarsal diaphysis region without focal area of apparent trauma, without open skin drainage pus tracking fluctuance malodor signs of infection   Neurological:      Mental Status: She is alert and oriented to person, place, and time.      Sensory: No sensory deficit.      Motor: No tremor, atrophy or abnormal muscle tone.      Gait: Gait normal.      Comments: Negative tinel sign to percussion sural, superficial peroneal, deep peroneal, saphenous, and posterior tibial nerves right and left ankles and feet.    Negative allodynia both feet   Psychiatric:         Behavior: Behavior is cooperative.           Assessment:       Encounter Diagnoses   Name Primary?    Foot fracture, left, open, initial encounter Yes    Swelling of left foot     Traumatic avulsion of nail plate of  toe, initial encounter          Plan:       Oliva was seen today for foot injury.    Diagnoses and all orders for this visit:    Foot fracture, left, open, initial encounter  -     X-Ray Foot Complete 3 view Left; Future  -     X-Ray Ankle Complete Left; Future    Swelling of left foot  -     X-Ray Foot Complete 3 view Left; Future  -     X-Ray Ankle Complete Left; Future    Traumatic avulsion of nail plate of toe, initial encounter    Other orders  The following orders have not been finalized:  -     Cancel: LIDOcaine-prilocaine (EMLA) cream      I counseled the patient on her conditions, their implications and medical management.        Ace bandage left foot toes to mid calf.  Ice elevate when possible.      Dispense fracture boot left.  Use for all weight-bearing to protect foot for transfers.      Cleansed nail bed right 3rd toe.  Cover with Band-Aid.  Patient do same daily until completely healed.      X-rays left foot.  Follow 2 weeks, sooner p.r.n.    Follow up in about 1 month (around 9/17/2023).

## 2023-08-18 ENCOUNTER — TELEPHONE (OUTPATIENT)
Dept: PODIATRY | Facility: CLINIC | Age: 43
End: 2023-08-18
Payer: MEDICARE

## 2023-08-18 NOTE — TELEPHONE ENCOUNTER
Tried to reach patient phone kept ringing.        ----- Message from Vamshi Wood DPM sent at 8/18/2023  9:46 AM CDT -----  Regarding: RE: X-ray results  Looks like she broke the heads of the metatarsals 2,3,4 left - they are in good position.  Current treatment and follow up should be appropriate for full recovery.  ----- Message -----  From: Wanda Walker MA  Sent: 8/17/2023   2:25 PM CDT  To: Vamshi Wood DPM  Subject: X-ray results                                    Patient wants to speak with you about X-ray results.

## 2023-08-18 NOTE — TELEPHONE ENCOUNTER
Staff informed patient she broke the heads of the metatarsals 2,3,4 left - they are in good position.  Current treatment and follow up should be appropriate for full recovery. Per Dr. Wood.    Patient verbalized understanding.

## 2023-09-20 ENCOUNTER — APPOINTMENT (OUTPATIENT)
Dept: RADIOLOGY | Facility: OTHER | Age: 43
End: 2023-09-20
Attending: PODIATRIST
Payer: MEDICARE

## 2023-09-20 ENCOUNTER — OFFICE VISIT (OUTPATIENT)
Dept: PODIATRY | Facility: CLINIC | Age: 43
End: 2023-09-20
Payer: MEDICARE

## 2023-09-20 ENCOUNTER — TELEPHONE (OUTPATIENT)
Dept: PODIATRY | Facility: CLINIC | Age: 43
End: 2023-09-20
Payer: MEDICARE

## 2023-09-20 VITALS — HEIGHT: 67 IN | BODY MASS INDEX: 15.7 KG/M2 | WEIGHT: 100 LBS

## 2023-09-20 DIAGNOSIS — M79.672 FOOT PAIN, LEFT: ICD-10-CM

## 2023-09-20 DIAGNOSIS — S92.302D CLOSED NONDISPLACED FRACTURE OF METATARSAL BONE OF LEFT FOOT WITH ROUTINE HEALING, UNSPECIFIED METATARSAL, SUBSEQUENT ENCOUNTER: Primary | ICD-10-CM

## 2023-09-20 DIAGNOSIS — S92.302D CLOSED NONDISPLACED FRACTURE OF METATARSAL BONE OF LEFT FOOT WITH ROUTINE HEALING, UNSPECIFIED METATARSAL, SUBSEQUENT ENCOUNTER: ICD-10-CM

## 2023-09-20 PROCEDURE — 73630 X-RAY EXAM OF FOOT: CPT | Mod: TC,LT

## 2023-09-20 PROCEDURE — 73630 X-RAY EXAM OF FOOT: CPT | Mod: 26,LT,, | Performed by: RADIOLOGY

## 2023-09-20 PROCEDURE — 73630 XR FOOT COMPLETE 3 VIEW LEFT: ICD-10-PCS | Mod: 26,LT,, | Performed by: RADIOLOGY

## 2023-09-20 PROCEDURE — 99499 NO LOS: ICD-10-PCS | Mod: S$GLB,,, | Performed by: PODIATRIST

## 2023-09-20 PROCEDURE — 99499 UNLISTED E&M SERVICE: CPT | Mod: S$GLB,,, | Performed by: PODIATRIST

## 2023-09-20 PROCEDURE — 99999 PR PBB SHADOW E&M-EST. PATIENT-LVL IV: CPT | Mod: PBBFAC,,, | Performed by: PODIATRIST

## 2023-09-20 PROCEDURE — 99999 PR PBB SHADOW E&M-EST. PATIENT-LVL IV: ICD-10-PCS | Mod: PBBFAC,,, | Performed by: PODIATRIST

## 2023-09-20 NOTE — PROGRESS NOTES
Subjective:      Patient ID: Oliva June is a 42 y.o. female.    Chief Complaint: Follow-up (L foot pain)    About 5 weeks status post fall from wheelchair which she broke distal metatarsals 234 left.  Ambulating minimally in fracture boot left with cane for stability.  She mostly stance for transfers.    Review of Systems   Constitutional: Negative for chills, diaphoresis, fever, malaise/fatigue and night sweats.   Cardiovascular:  Negative for claudication, cyanosis, leg swelling and syncope.   Skin:  Negative for color change, dry skin, nail changes, rash, suspicious lesions and unusual hair distribution.   Musculoskeletal:  Positive for joint pain. Negative for falls, joint swelling, muscle cramps, muscle weakness and stiffness.   Gastrointestinal:  Negative for constipation, diarrhea, nausea and vomiting.   Neurological:  Negative for brief paralysis, disturbances in coordination, focal weakness, numbness, paresthesias, sensory change and tremors.         Objective:      Physical Exam  Constitutional:       General: She is not in acute distress.     Appearance: She is well-developed. She is not diaphoretic.   Cardiovascular:      Pulses:           Popliteal pulses are 2+ on the right side and 2+ on the left side.        Dorsalis pedis pulses are 2+ on the right side and 2+ on the left side.        Posterior tibial pulses are 2+ on the right side and 2+ on the left side.      Comments: Capillary refill 3 seconds all toes/distal feet, all toes/both feet warm to touch.      Negative lymphadenopathy bilateral popliteal fossa and tarsal tunnel.      Negavie lower extremity edema bilateral.    Musculoskeletal:      Right ankle: No swelling, deformity, ecchymosis or lacerations. Normal range of motion. Normal pulse.      Right Achilles Tendon: Normal. No defects. Ayers's test negative.      Comments: Decreased streaked bilateral lower extremities.  Normal passive range of motion bilateral feet and  ankles.    Patient relates no functional deficit from baseline.   Lymphadenopathy:      Lower Body: No right inguinal adenopathy. No left inguinal adenopathy.      Comments: Negative lymphadenopathy bilateral popliteal fossa and tarsal tunnel.    Negative lymphangitic streaking bilateral feet/ankles/legs.   Skin:     General: Skin is warm and dry.      Capillary Refill: Capillary refill takes 2 to 3 seconds.      Coloration: Skin is not pale.      Findings: No abrasion, bruising, burn, ecchymosis, erythema, laceration, lesion or rash.      Nails: There is no clubbing.      Comments:   Skin thin, shiny, atrophic, with decreased density and distribution of pedal hair bilateral, but without hyperpigmentation, elton discoloration,  ulcers, masses, nodules or cords palpated bilateral feet and legs.     Neurological:      Mental Status: She is alert and oriented to person, place, and time.      Sensory: No sensory deficit.      Motor: No tremor, atrophy or abnormal muscle tone.      Gait: Gait normal.      Comments: Negative tinel sign to percussion sural, superficial peroneal, deep peroneal, saphenous, and posterior tibial nerves right and left ankles and feet.    Negative allodynia both feet   Psychiatric:         Behavior: Behavior is cooperative.           Assessment:       Encounter Diagnoses   Name Primary?    Closed nondisplaced fracture of metatarsal bone of left foot with routine healing, unspecified metatarsal, subsequent encounter Yes    Foot pain, left          Plan:       Oliva was seen today for follow-up.    Diagnoses and all orders for this visit:    Closed nondisplaced fracture of metatarsal bone of left foot with routine healing, unspecified metatarsal, subsequent encounter  -     X-Ray Foot Complete Left; Future    Foot pain, left  -     X-Ray Foot Complete Left; Future      I counseled the patient on her conditions, their implications and medical management.        Repeat xrays left foot-signs of  healing?.    Continue function to tolerance in fracture boot with cane for stability.    Emla topically once nightly for pain left forefoot.              Follow up in about 1 month (around 10/20/2023).

## 2023-11-03 ENCOUNTER — TELEPHONE (OUTPATIENT)
Dept: NEUROLOGY | Facility: CLINIC | Age: 43
End: 2023-11-03

## 2023-11-03 NOTE — TELEPHONE ENCOUNTER
----- Message from Giulia Abdi sent at 11/3/2023  8:38 AM CDT -----  Type:  Sooner Apoointment Request    Caller is requesting a sooner appointment.  Caller declined first available appointment listed below.  Caller will not accept being placed on the waitlist and is requesting a message be sent to doctor.  Name of Caller:pt   When is the first available appointment?  Symptoms:MS   Would the patient rather a call back or a response via [facility]? call  Best Call Back Number:[phone]  Additional Information: pt would to know when the like first available appt will be

## 2023-12-05 ENCOUNTER — OFFICE VISIT (OUTPATIENT)
Dept: RHEUMATOLOGY | Facility: CLINIC | Age: 43
End: 2023-12-05
Payer: MEDICARE

## 2023-12-05 VITALS
HEIGHT: 67 IN | DIASTOLIC BLOOD PRESSURE: 80 MMHG | SYSTOLIC BLOOD PRESSURE: 108 MMHG | BODY MASS INDEX: 15.66 KG/M2 | HEART RATE: 91 BPM

## 2023-12-05 DIAGNOSIS — G35 MULTIPLE SCLEROSIS: ICD-10-CM

## 2023-12-05 DIAGNOSIS — M19.90 INFLAMMATORY ARTHRITIS: Primary | ICD-10-CM

## 2023-12-05 DIAGNOSIS — K50.119 CROHN'S DISEASE OF COLON WITH COMPLICATION: ICD-10-CM

## 2023-12-05 DIAGNOSIS — Z79.1 NSAID LONG-TERM USE: ICD-10-CM

## 2023-12-05 DIAGNOSIS — M75.02 ADHESIVE CAPSULITIS OF LEFT SHOULDER: ICD-10-CM

## 2023-12-05 PROCEDURE — 99205 OFFICE O/P NEW HI 60 MIN: CPT | Mod: S$GLB,,, | Performed by: STUDENT IN AN ORGANIZED HEALTH CARE EDUCATION/TRAINING PROGRAM

## 2023-12-05 PROCEDURE — 3074F SYST BP LT 130 MM HG: CPT | Mod: CPTII,S$GLB,, | Performed by: STUDENT IN AN ORGANIZED HEALTH CARE EDUCATION/TRAINING PROGRAM

## 2023-12-05 PROCEDURE — 3079F DIAST BP 80-89 MM HG: CPT | Mod: CPTII,S$GLB,, | Performed by: STUDENT IN AN ORGANIZED HEALTH CARE EDUCATION/TRAINING PROGRAM

## 2023-12-05 PROCEDURE — 99999 PR PBB SHADOW E&M-EST. PATIENT-LVL V: CPT | Mod: PBBFAC,,, | Performed by: STUDENT IN AN ORGANIZED HEALTH CARE EDUCATION/TRAINING PROGRAM

## 2023-12-05 PROCEDURE — 3008F PR BODY MASS INDEX (BMI) DOCUMENTED: ICD-10-PCS | Mod: CPTII,S$GLB,, | Performed by: STUDENT IN AN ORGANIZED HEALTH CARE EDUCATION/TRAINING PROGRAM

## 2023-12-05 PROCEDURE — 3008F BODY MASS INDEX DOCD: CPT | Mod: CPTII,S$GLB,, | Performed by: STUDENT IN AN ORGANIZED HEALTH CARE EDUCATION/TRAINING PROGRAM

## 2023-12-05 PROCEDURE — 3074F PR MOST RECENT SYSTOLIC BLOOD PRESSURE < 130 MM HG: ICD-10-PCS | Mod: CPTII,S$GLB,, | Performed by: STUDENT IN AN ORGANIZED HEALTH CARE EDUCATION/TRAINING PROGRAM

## 2023-12-05 PROCEDURE — 99999 PR PBB SHADOW E&M-EST. PATIENT-LVL V: ICD-10-PCS | Mod: PBBFAC,,, | Performed by: STUDENT IN AN ORGANIZED HEALTH CARE EDUCATION/TRAINING PROGRAM

## 2023-12-05 PROCEDURE — 1159F PR MEDICATION LIST DOCUMENTED IN MEDICAL RECORD: ICD-10-PCS | Mod: CPTII,S$GLB,, | Performed by: STUDENT IN AN ORGANIZED HEALTH CARE EDUCATION/TRAINING PROGRAM

## 2023-12-05 PROCEDURE — 1159F MED LIST DOCD IN RCRD: CPT | Mod: CPTII,S$GLB,, | Performed by: STUDENT IN AN ORGANIZED HEALTH CARE EDUCATION/TRAINING PROGRAM

## 2023-12-05 PROCEDURE — 3079F PR MOST RECENT DIASTOLIC BLOOD PRESSURE 80-89 MM HG: ICD-10-PCS | Mod: CPTII,S$GLB,, | Performed by: STUDENT IN AN ORGANIZED HEALTH CARE EDUCATION/TRAINING PROGRAM

## 2023-12-05 PROCEDURE — 99205 PR OFFICE/OUTPT VISIT, NEW, LEVL V, 60-74 MIN: ICD-10-PCS | Mod: S$GLB,,, | Performed by: STUDENT IN AN ORGANIZED HEALTH CARE EDUCATION/TRAINING PROGRAM

## 2023-12-05 RX ORDER — ALBUTEROL SULFATE 90 UG/1
AEROSOL, METERED RESPIRATORY (INHALATION)
COMMUNITY

## 2023-12-05 RX ORDER — CLOTRIMAZOLE AND BETAMETHASONE DIPROPIONATE 10; .64 MG/G; MG/G
CREAM TOPICAL
COMMUNITY

## 2023-12-05 NOTE — PROGRESS NOTES
RHEUMATOLOGY OUTPATIENT CLINIC NOTE    12/5/2023    Attending Rheumatologist: Pooja Alvarez  Primary Care Provider: [facility] -   Physician Requesting Consultation: David Madden MD  [address]  [address]  [place] [id number]  Chief Complaint/Reason For Consultation:  No chief complaint on file.      Subjective:       UMAIR June is a 42 y.o. White female with multiple sclerosis, history of DVT of left leg on eliquis , May-Thurner syndrome, hydradenitis Chron's Disease who comes for evaluation of joint pain.     She comes with mother Rebekah.     For the past year, she has noted pain in hands along with swelling, worse in the morning, has changed rings due to swelling, stiffness last for 30 min, has been dropping things. She also reports pain in left knee, worse in the morning but no swelling. Reports pain in bilateral shoulder, this is more chronic and has rotator cuff pathology during childhood. However since admission to the hospital about 2 years ago, she is unable to fully abduct her shoulder above her head. Most recently had fracture in left foot and has been using a CAM boot. She follows with podiatry but has not seen them since 9/2023, unsure of how long she needs the boot. She has been taking daily ibuprofen for pain control   She reports that 2 1/2 years ago, she was admitted in the hospital due to MS, has prolonged stay and did not do any physical activity and was deconditioned. After d/c had difficulty with transportation for PT. She uses a cane for ambulation around her small apartment and uses a wheelchair when going out.   She denies any skin rashes, psoriasis, back pain. Reports occasional oral ulcers and dry mouth (she is on oxybutinin).     MS - she follows with Dr. Madden at [facility] [place]. Has tried many medications for MS including glatiramer, dalfampridine, ocrevus, mavenclad, gylenia. Tysabri not an option due to positive SURAJ virus  serologies however per notes SURAJ virus PCR on CSF was negative in 2019.     Chron's Disease- reports that only medication that she has taken is dicyclomine. Per notes, there was consideration for Humira at some point but was never prescribed. Has not seen GI in a while. No blood in the stool, unintentional weight loss. Has alternating constipation and diarrhea.   EGD 10/2020 normal esophagus, stomach and duodenum.   Colonoscopy 2022 - circumferential area of superficially ulcerated mucosa was present with overlying erythema in the distal sigmoid colon, decreased area of vascularity with granularity of descending colon and sigmoid colon. There were few scattered erosions in the sigmoid colon. Terminal ileum normal. These findings could represent Chron's vs ischemic colitis.     Hydradenitis - this has remained stable but has followed with Dermatology in the past for it.   Also has seborrheic dermatitis on topical management.     Labs   Neg HIV, Hepatitis B surface Ag, core antibody, Quantiferon  IgG and IgM normal  CMP normal       Chronic comorbid conditions affecting medical decision making today:  Past Medical History:   Diagnosis Date    Anticoagulant long-term use     Anxiety     Asthma     Crohn disease     Depression     DVT (deep venous thrombosis)     Encounter for blood transfusion     Insomnia     May-Thurner syndrome     abdominal stents    Multiple sclerosis     Neuropathy      Past Surgical History:   Procedure Laterality Date    HYSTERECTOMY      REMOVAL OF TRACHEOSTOMY TUBE      THYROIDECTOMY      TONSILLECTOMY      TRACHEOTOMY      only for 2 weeks    TURBINOPLASTY       History reviewed. No pertinent family history.  Social History     Substance and Sexual Activity   Alcohol Use Yes    Comment: occasional     Social History     Tobacco Use   Smoking Status Former    Current packs/day: 0.00    Types: Cigarettes    Quit date: 2019    Years since quittin.8   Smokeless Tobacco Former     Social  History     Substance and Sexual Activity   Drug Use Never       Current Outpatient Medications:     apixaban (ELIQUIS) 5 mg Tab, Take 5 mg by mouth 2 (two) times daily., Disp: , Rfl:     baclofen (LIORESAL) 20 MG tablet, Take 10 mg by mouth 4 (four) times daily. , Disp: , Rfl:     BOTOX 200 unit SolR, , Disp: , Rfl:     ciclopirox (LOPROX) 0.77 % Crea, Apply topically 2 (two) times daily., Disp: , Rfl:     ciclopirox (PENLAC) 8 % Soln, Apply topically nightly., Disp: 6.6 mL, Rfl: 11    clotrimazole (LOTRIMIN) 1 % cream, , Disp: , Rfl:     clotrimazole-betamethasone 1-0.05% (LOTRISONE) cream, SMARTSI Topical Daily, Disp: , Rfl:     dalfampridine 10 mg Tb12, Take by mouth after dinner. , Disp: , Rfl:     diazePAM (VALIUM) 10 MG Tab, Take 5 mg by mouth 2 (two) times daily as needed. , Disp: , Rfl:     dicyclomine (BENTYL) 10 MG capsule, TAKE 1 CAPSULE BY MOUTH FOUR TIMES DAILY BEFORE MEALS AND AT NIGHT, Disp: , Rfl:     escitalopram oxalate (LEXAPRO) 10 MG tablet, Take 10 mg by mouth once daily., Disp: , Rfl:     esomeprazole (NEXIUM) 40 MG capsule, Take 40 mg by mouth., Disp: , Rfl:     GABAPENTIN ORAL, Take 600 capsules by mouth after dinner. , Disp: , Rfl:     hydroquinone 4 % Crea, Use hs for dark spots, Disp: 28.35 g, Rfl: 3    ibuprofen (ADVIL,MOTRIN) 800 MG tablet, Take 800 mg by mouth 3 (three) times daily., Disp: , Rfl:     levothyroxine (SYNTHROID) 112 MCG tablet, Take 112 mcg by mouth before breakfast., Disp: , Rfl:     montelukast (SINGULAIR) 10 mg tablet, Take 10 mg by mouth every evening., Disp: , Rfl:     ondansetron (ZOFRAN-ODT) 4 MG TbDL, Take 4 mg by mouth 2 (two) times daily as needed., Disp: , Rfl:     oxybutynin (DITROPAN-XL) 10 MG 24 hr tablet, , Disp: , Rfl:     rimegepant (NURTEC) 75 mg odt, Take 75 mg by mouth daily as needed., Disp: , Rfl:     tretinoin (RETIN-A) 0.1 % cream, Use small amount hs on face, Disp: 20 g, Rfl: 6    albuterol (PROVENTIL/VENTOLIN HFA) 90 mcg/actuation inhaler,  "Inhale into the lungs., Disp: , Rfl:      Objective:         Vitals:    12/05/23 1412   BP: 108/80   Pulse: 91     Physical Exam  Left 4th PIP with mild synovitis.   Rest of PIP tender w/o synovitis.   Left shoulder with normal passive ROM but decreased active ROM.   Left knee with no tenderness or effusion   Left CAM boot present   No oral ulcers.   No skin rashes     Reviewed old and all outside pertinent medical records available.    All lab results personally reviewed and interpreted by me.  Lab Results   Component Value Date    WBC 9.10 12/05/2023    HGB 14.6 12/05/2023    HCT 45.0 12/05/2023     (H) 12/05/2023    MCH 33.6 (H) 12/05/2023    MCHC 32.4 12/05/2023    RDW 13.8 12/05/2023     12/05/2023    MPV 11.8 12/05/2023       Lab Results   Component Value Date     04/12/2022    K 4.0 04/12/2022     04/12/2022    CO2 23 04/12/2022    GLU 54 (L) 04/12/2022    BUN 10 04/12/2022    CALCIUM 8.4 (L) 04/12/2022    PROT 6.4 02/12/2020    ALBUMIN 3.4 01/05/2022    BILITOT 0.3 01/05/2022    AST 12 01/05/2022    ALKPHOS 51 (L) 02/12/2020    ALT 6 01/05/2022       Lab Results   Component Value Date    COLORU Yellow 03/29/2021    APPEARANCEUA Clear 02/11/2020    SPECGRAV 1.010 02/11/2020    PHUR 6.0 02/11/2020    PROTEINUA Negative 02/11/2020    KETONESU Negative 02/11/2020    LEUKOCYTESUR Negative 03/29/2021    NITRITE Negative 02/11/2020    UROBILINOGEN Normal 03/29/2021       No results found for: "CRP"    Lab Results   Component Value Date    SEDRATE 1 12/05/2023       No components found for: "25OHVITDTOT", "66XVTVRO5", "86JBXXIS6", "METHODNOTE"    No results found for: "URICACID"    No results found for: "QUANTIFERON", "HEPBCOREIGG", "HEPBSAB", "HEPBSAG", "HEPCAB"    Imaging:  All imaging reviewed and independently interpreted by me.     ASSESSMENT / PLAN:     Oliva June is a 42 y.o. White female with multiple sclerosis, history of DVT of left leg on eliquis , May-Thurner syndrome, " hydradenitis Chron's Disease who comes for evaluation of joint pain.     1. Inflammatory arthritis  -Chronic pain in hands, left knee that appears to be inflammatory. She has MS, Chron's Disease and hydradenitis. Will need to rule out autoimmune causes of joint pain. Consider seronegative spondyloarthritis given CD and HS.   -Obtain POOJA, RF, CCP, SSA, SSB, ESR, CRP, CBC, HLAB27.   -Obtain XR of hands, bilateral knees and left shoulder.   -May consider MTX in the future.   -Avoid TNFs at all due to MS. Avoid IL-17 inh due to IBD.     2. Crohn's disease of colon with complication  -Status of her Chron's Disease is unknown   -Records showed colonoscopy in 1/2022 that showed some abnormalities and plan to repeat colonoscopy in 6 months but not done.   -Will refer to GI now.     3. Multiple sclerosis  -Appears to be stable. Currently off disease modifying therapy  -Has failed many medications. Follows with [place]. Madden [place] [facility] [place] and during next visit, they will decide what is the next step on her management     4. NSAID long-term use  -She has been using ibuprofen daily for a while. Advised her to not use it due to high risk of GI bleeding with eliquis.     5. Frozen left shoulder  -Limited active ROM of the left shoulder. Discussed PT referral but she has difficulty with transportation  -provided handout on exercises that she can do at home    No follow-ups on file.    Method of contact with patient concerns: [facility]    Disclaimer:  This note is prepared using voice recognition software and as such is likely to have errors and has not been proof read. Please contact me for questions.     Time spent: 60 minutes in face to face discussion concerning diagnosis, prognosis, review of lab and test results, benefits of treatment as well as management of disease, counseling of patient and coordination of care between various health care providers.  Greater than half the time spent was used for coordination of care  and counseling of patient.    Pooja Alvarez M.D.  Rheumatology  [facility]

## 2024-01-22 ENCOUNTER — PATIENT MESSAGE (OUTPATIENT)
Dept: PSYCHIATRY | Facility: CLINIC | Age: 44
End: 2024-01-22
Payer: MEDICARE

## 2024-01-29 ENCOUNTER — PATIENT MESSAGE (OUTPATIENT)
Dept: RHEUMATOLOGY | Facility: CLINIC | Age: 44
End: 2024-01-29
Payer: MEDICARE

## 2024-01-29 ENCOUNTER — TELEPHONE (OUTPATIENT)
Dept: RHEUMATOLOGY | Facility: CLINIC | Age: 44
End: 2024-01-29
Payer: MEDICARE

## 2024-01-29 ENCOUNTER — PATIENT MESSAGE (OUTPATIENT)
Dept: DERMATOLOGY | Facility: CLINIC | Age: 44
End: 2024-01-29
Payer: MEDICARE

## 2024-01-29 NOTE — TELEPHONE ENCOUNTER
Called to reschedule patient's appointment on February 12, 2024 b/c Dr. Pederson will be out of the office. I was unable to leave a voicemail to let the patient know her appointment has been rescheduled to February 20, 2024 at 8:30 AM.

## 2024-05-02 ENCOUNTER — PATIENT MESSAGE (OUTPATIENT)
Dept: PSYCHIATRY | Facility: CLINIC | Age: 44
End: 2024-05-02
Payer: MEDICARE

## 2024-07-25 ENCOUNTER — PATIENT MESSAGE (OUTPATIENT)
Dept: PSYCHIATRY | Facility: CLINIC | Age: 44
End: 2024-07-25
Payer: MEDICARE

## 2024-08-05 ENCOUNTER — PATIENT MESSAGE (OUTPATIENT)
Dept: PSYCHIATRY | Facility: CLINIC | Age: 44
End: 2024-08-05
Payer: MEDICARE

## 2024-08-29 ENCOUNTER — PATIENT MESSAGE (OUTPATIENT)
Dept: RHEUMATOLOGY | Facility: CLINIC | Age: 44
End: 2024-08-29
Payer: MEDICARE

## 2024-12-16 ENCOUNTER — PATIENT MESSAGE (OUTPATIENT)
Dept: PSYCHIATRY | Facility: CLINIC | Age: 44
End: 2024-12-16
Payer: MEDICARE

## (undated) DEVICE — SOL PVP-I SCRUB 7.5% 4OZ

## (undated) DEVICE — UNDERGLOVES BIOGEL PI SZ 7 LF

## (undated) DEVICE — TRAY DRY SKIN SCRUB PREP

## (undated) DEVICE — TOWEL OR DISP STRL BLUE 4/PK

## (undated) DEVICE — HEMOSTAT SURGICEL 4X8IN

## (undated) DEVICE — POSITIONER HEAD DONUT 9IN FOAM

## (undated) DEVICE — BLADE SURG STAINLESS STEEL #11

## (undated) DEVICE — UNDERGLOVE BIOGEL PI SZ 6.5 LF

## (undated) DEVICE — SYR 10CC LUER LOCK

## (undated) DEVICE — SUT 2/0 30IN SILK BLK BRAI

## (undated) DEVICE — TIP YANKAUERS BULB NO VENT

## (undated) DEVICE — NDL N SERIES MICRO-DISSECTION

## (undated) DEVICE — GLOVE BIOGEL SKINSENSE PI 7.0

## (undated) DEVICE — POSITIONER HEEL FOAM CONVOLTD

## (undated) DEVICE — POSITIONER IV ARMBOARD FOAM

## (undated) DEVICE — ELECTRODE BLD EXT INSUL 1

## (undated) DEVICE — GLOVE BIOGEL SKINSENSE PI 7.5

## (undated) DEVICE — GLOVE BIOGEL SKINSENSE PI 6.5

## (undated) DEVICE — SPONGE IV DRAIN 4X4 STERILE

## (undated) DEVICE — SOL NS 1000CC

## (undated) DEVICE — SEE MEDLINE ITEM 157110

## (undated) DEVICE — DRESSING TRANS 4X4 TEGADERM

## (undated) DEVICE — SEE MEDLINE ITEM 156930

## (undated) DEVICE — SKINMARKER & RULER REGULAR X-F

## (undated) DEVICE — ELECTRODE REM PLYHSV RETURN 9

## (undated) DEVICE — CORD BIPOLAR 12 FOOT

## (undated) DEVICE — SEE MEDLINE ITEM 157131

## (undated) DEVICE — DRAPE HEAD/BAR 40 X 27 W/ADH

## (undated) DEVICE — GLOVE BIOGEL SKINSENSE PI 8.0

## (undated) DEVICE — TRACH TUBE CUFF FLEX DISP 6.5

## (undated) DEVICE — SPONGE KITTNER 1/4X 5/8 L STRL

## (undated) DEVICE — SUT SILK 3-0 BLK BR SH 30IN

## (undated) DEVICE — JELLY SURGILUBE 5GR

## (undated) DEVICE — SUT SILK 2-0 BLK BR KS 30 I

## (undated) DEVICE — SUT 2-0 12-18IN SILK

## (undated) DEVICE — NDL HYPO REG 25G X 1 1/2

## (undated) DEVICE — SEE MEDLINE ITEM 157166

## (undated) DEVICE — SPONGE SUPER KERLIX 6X6.75IN

## (undated) DEVICE — TRACH TUBE CUFF FLEX DISP 8.5

## (undated) DEVICE — SUT 3-0 12-18IN SILK

## (undated) DEVICE — TRACH TUBE CUFF FLEX DISP 7.5

## (undated) DEVICE — ADHESIVE MASTISOL VIAL 48/BX